# Patient Record
Sex: MALE | Race: BLACK OR AFRICAN AMERICAN | Employment: FULL TIME | URBAN - METROPOLITAN AREA
[De-identification: names, ages, dates, MRNs, and addresses within clinical notes are randomized per-mention and may not be internally consistent; named-entity substitution may affect disease eponyms.]

---

## 2017-03-06 DIAGNOSIS — E78.5 HYPERLIPIDEMIA, UNSPECIFIED HYPERLIPIDEMIA TYPE: ICD-10-CM

## 2017-03-06 RX ORDER — SIMVASTATIN 40 MG
40 TABLET ORAL NIGHTLY
Qty: 90 TABLET | Refills: 2 | Status: CANCELLED | OUTPATIENT
Start: 2017-03-06

## 2017-04-03 ENCOUNTER — OFFICE VISIT (OUTPATIENT)
Dept: INTERNAL MEDICINE CLINIC | Age: 47
End: 2017-04-03

## 2017-04-03 VITALS
BODY MASS INDEX: 27.3 KG/M2 | HEART RATE: 69 BPM | SYSTOLIC BLOOD PRESSURE: 120 MMHG | HEIGHT: 71 IN | TEMPERATURE: 97.9 F | OXYGEN SATURATION: 98 % | RESPIRATION RATE: 12 BRPM | WEIGHT: 195 LBS | DIASTOLIC BLOOD PRESSURE: 72 MMHG

## 2017-04-03 DIAGNOSIS — J30.9 ALLERGIC RHINITIS, UNSPECIFIED: ICD-10-CM

## 2017-04-03 DIAGNOSIS — R73.03 PREDIABETES: ICD-10-CM

## 2017-04-03 DIAGNOSIS — E78.5 HYPERLIPIDEMIA, UNSPECIFIED HYPERLIPIDEMIA TYPE: Primary | ICD-10-CM

## 2017-04-03 PROCEDURE — 99213 OFFICE O/P EST LOW 20 MIN: CPT | Performed by: INTERNAL MEDICINE

## 2017-04-03 ASSESSMENT — ENCOUNTER SYMPTOMS
COUGH: 0
CHEST TIGHTNESS: 0
WHEEZING: 0
SORE THROAT: 0
RHINORRHEA: 0
SHORTNESS OF BREATH: 0

## 2017-04-04 LAB
CHOLESTEROL, TOTAL: 180 MG/DL (ref 0–199)
ESTIMATED AVERAGE GLUCOSE: 119.8 MG/DL
HBA1C MFR BLD: 5.8 %
HDLC SERPL-MCNC: 58 MG/DL (ref 40–60)
LDL CHOLESTEROL CALCULATED: 109 MG/DL
TRIGL SERPL-MCNC: 65 MG/DL (ref 0–150)
VLDLC SERPL CALC-MCNC: 13 MG/DL

## 2017-10-19 ENCOUNTER — NURSE ONLY (OUTPATIENT)
Dept: INTERNAL MEDICINE CLINIC | Age: 47
End: 2017-10-19

## 2017-10-19 DIAGNOSIS — Z23 NEED FOR INFLUENZA VACCINATION: Primary | ICD-10-CM

## 2017-10-19 PROCEDURE — 90630 INFLUENZA, QUADV, 18-64 YRS, ID, PF, MICRO INJ, 0.1ML (FLUZONE QUADV, PF): CPT | Performed by: INTERNAL MEDICINE

## 2017-10-19 PROCEDURE — 90471 IMMUNIZATION ADMIN: CPT | Performed by: INTERNAL MEDICINE

## 2017-11-27 ENCOUNTER — OFFICE VISIT (OUTPATIENT)
Dept: INTERNAL MEDICINE CLINIC | Age: 47
End: 2017-11-27

## 2017-11-27 VITALS
RESPIRATION RATE: 12 BRPM | BODY MASS INDEX: 27.41 KG/M2 | HEIGHT: 71 IN | HEART RATE: 60 BPM | OXYGEN SATURATION: 99 % | WEIGHT: 195.8 LBS | SYSTOLIC BLOOD PRESSURE: 120 MMHG | TEMPERATURE: 98.9 F | DIASTOLIC BLOOD PRESSURE: 70 MMHG

## 2017-11-27 DIAGNOSIS — J30.9 ALLERGIC RHINITIS, UNSPECIFIED CHRONICITY, UNSPECIFIED SEASONALITY, UNSPECIFIED TRIGGER: ICD-10-CM

## 2017-11-27 DIAGNOSIS — Z00.00 ANNUAL PHYSICAL EXAM: ICD-10-CM

## 2017-11-27 DIAGNOSIS — Z00.00 ANNUAL PHYSICAL EXAM: Primary | ICD-10-CM

## 2017-11-27 DIAGNOSIS — E78.2 MIXED HYPERLIPIDEMIA: ICD-10-CM

## 2017-11-27 DIAGNOSIS — R73.03 PREDIABETES: ICD-10-CM

## 2017-11-27 LAB
A/G RATIO: 1.7 (ref 1.1–2.2)
ALBUMIN SERPL-MCNC: 4.7 G/DL (ref 3.4–5)
ALP BLD-CCNC: 65 U/L (ref 40–129)
ALT SERPL-CCNC: 26 U/L (ref 10–40)
ANION GAP SERPL CALCULATED.3IONS-SCNC: 19 MMOL/L (ref 3–16)
AST SERPL-CCNC: 25 U/L (ref 15–37)
BASOPHILS ABSOLUTE: 0 K/UL (ref 0–0.2)
BASOPHILS RELATIVE PERCENT: 0.4 %
BILIRUB SERPL-MCNC: 0.7 MG/DL (ref 0–1)
BILIRUBIN, POC: NORMAL
BLOOD URINE, POC: NORMAL
BUN BLDV-MCNC: 12 MG/DL (ref 7–20)
CALCIUM SERPL-MCNC: 10.2 MG/DL (ref 8.3–10.6)
CHLORIDE BLD-SCNC: 98 MMOL/L (ref 99–110)
CHOLESTEROL, TOTAL: 190 MG/DL (ref 0–199)
CLARITY, POC: NORMAL
CO2: 28 MMOL/L (ref 21–32)
COLOR, POC: NORMAL
CREAT SERPL-MCNC: 0.9 MG/DL (ref 0.9–1.3)
EOSINOPHILS ABSOLUTE: 0 K/UL (ref 0–0.6)
EOSINOPHILS RELATIVE PERCENT: 0.8 %
GFR AFRICAN AMERICAN: >60
GFR NON-AFRICAN AMERICAN: >60
GLOBULIN: 2.7 G/DL
GLUCOSE BLD-MCNC: 90 MG/DL (ref 70–99)
GLUCOSE URINE, POC: NORMAL
HCT VFR BLD CALC: 45.6 % (ref 40.5–52.5)
HDLC SERPL-MCNC: 66 MG/DL (ref 40–60)
HEMOGLOBIN: 14.9 G/DL (ref 13.5–17.5)
KETONES, POC: NORMAL
LDL CHOLESTEROL CALCULATED: 113 MG/DL
LEUKOCYTE EST, POC: NORMAL
LYMPHOCYTES ABSOLUTE: 1.7 K/UL (ref 1–5.1)
LYMPHOCYTES RELATIVE PERCENT: 27.6 %
MCH RBC QN AUTO: 28.3 PG (ref 26–34)
MCHC RBC AUTO-ENTMCNC: 32.6 G/DL (ref 31–36)
MCV RBC AUTO: 86.7 FL (ref 80–100)
MONOCYTES ABSOLUTE: 0.5 K/UL (ref 0–1.3)
MONOCYTES RELATIVE PERCENT: 7.7 %
NEUTROPHILS ABSOLUTE: 3.9 K/UL (ref 1.7–7.7)
NEUTROPHILS RELATIVE PERCENT: 63.5 %
NITRITE, POC: NORMAL
PDW BLD-RTO: 14.1 % (ref 12.4–15.4)
PH, POC: 6.5
PLATELET # BLD: 178 K/UL (ref 135–450)
PMV BLD AUTO: 9.9 FL (ref 5–10.5)
POTASSIUM SERPL-SCNC: 4.7 MMOL/L (ref 3.5–5.1)
PROTEIN, POC: NORMAL
RBC # BLD: 5.26 M/UL (ref 4.2–5.9)
SODIUM BLD-SCNC: 145 MMOL/L (ref 136–145)
SPECIFIC GRAVITY, POC: 1.01
TOTAL PROTEIN: 7.4 G/DL (ref 6.4–8.2)
TRIGL SERPL-MCNC: 55 MG/DL (ref 0–150)
UROBILINOGEN, POC: 0.2
VLDLC SERPL CALC-MCNC: 11 MG/DL
WBC # BLD: 6.2 K/UL (ref 4–11)

## 2017-11-27 PROCEDURE — 99396 PREV VISIT EST AGE 40-64: CPT | Performed by: INTERNAL MEDICINE

## 2017-11-27 PROCEDURE — 81002 URINALYSIS NONAUTO W/O SCOPE: CPT | Performed by: INTERNAL MEDICINE

## 2017-11-27 RX ORDER — SIMVASTATIN 40 MG
40 TABLET ORAL NIGHTLY
Qty: 90 TABLET | Refills: 2 | Status: SHIPPED | OUTPATIENT
Start: 2017-11-27 | End: 2018-08-08 | Stop reason: SDUPTHER

## 2017-11-27 ASSESSMENT — ENCOUNTER SYMPTOMS
CONSTIPATION: 0
RECTAL PAIN: 0
ABDOMINAL DISTENTION: 0
EYE ITCHING: 0
DIARRHEA: 0
SORE THROAT: 0
SINUS PAIN: 0
FACIAL SWELLING: 0
RHINORRHEA: 0
BACK PAIN: 0
ANAL BLEEDING: 0
CHOKING: 0
EYE REDNESS: 0
ABDOMINAL PAIN: 0
TROUBLE SWALLOWING: 0
SHORTNESS OF BREATH: 0
SINUS PRESSURE: 0
VOMITING: 0
CHEST TIGHTNESS: 0
EYE DISCHARGE: 0
PHOTOPHOBIA: 0
NAUSEA: 0
APNEA: 0
BLOOD IN STOOL: 0
VOICE CHANGE: 0
COUGH: 0
WHEEZING: 0
EYE PAIN: 0

## 2017-11-27 ASSESSMENT — PATIENT HEALTH QUESTIONNAIRE - PHQ9
2. FEELING DOWN, DEPRESSED OR HOPELESS: 0
SUM OF ALL RESPONSES TO PHQ QUESTIONS 1-9: 0
SUM OF ALL RESPONSES TO PHQ9 QUESTIONS 1 & 2: 0
1. LITTLE INTEREST OR PLEASURE IN DOING THINGS: 0

## 2017-11-27 NOTE — PATIENT INSTRUCTIONS
-Continue same medications  -Low fat, low cholesterol diet  -Regular aerobic exercise      Patient Education        Well Visit, Ages 25 to 48: Care Instructions  Your Care Instructions  Physical exams can help you stay healthy. Your doctor has checked your overall health and may have suggested ways to take good care of yourself. He or she also may have recommended tests. At home, you can help prevent illness with healthy eating, regular exercise, and other steps. Follow-up care is a key part of your treatment and safety. Be sure to make and go to all appointments, and call your doctor if you are having problems. It's also a good idea to know your test results and keep a list of the medicines you take. How can you care for yourself at home? · Reach and stay at a healthy weight. This will lower your risk for many problems, such as obesity, diabetes, heart disease, and high blood pressure. · Get at least 30 minutes of physical activity on most days of the week. Walking is a good choice. You also may want to do other activities, such as running, swimming, cycling, or playing tennis or team sports. Discuss any changes in your exercise program with your doctor. · Do not smoke or allow others to smoke around you. If you need help quitting, talk to your doctor about stop-smoking programs and medicines. These can increase your chances of quitting for good. · Talk to your doctor about whether you have any risk factors for sexually transmitted infections (STIs). Having one sex partner (who does not have STIs and does not have sex with anyone else) is a good way to avoid these infections. · Use birth control if you do not want to have children at this time. Talk with your doctor about the choices available and what might be best for you. · Protect your skin from too much sun. When you're outdoors from 10 a.m. to 4 p.m., stay in the shade or cover up with clothing and a hat with a wide brim.  Wear sunglasses that block UV if you have sex with more than one person, especially if your partners do not wear condoms. For men  · Tests for sexually transmitted infections (STIs). Ask whether you should have tests for STIs. You may be at risk if you have sex with more than one person, especially if you do not wear a condom. · Testicular cancer exam. Ask your doctor whether you should check your testicles regularly. · Prostate exam. Talk to your doctor about whether you should have a blood test (called a PSA test) for prostate cancer. Experts differ on whether and when men should have this test. Some experts suggest it if you are older than 39 and are -American or have a father or brother who got prostate cancer when he was younger than 72. When should you call for help? Watch closely for changes in your health, and be sure to contact your doctor if you have any problems or symptoms that concern you. Where can you learn more? Go to https://[employer]. org and sign in to your [employer] account. Enter P072 in the [employer] box to learn more about \"Well Visit, Ages 25 to 48: Care Instructions. \"     If you do not have an account, please click on the \"Sign Up Now\" link. Current as of: July 19, 2016  Content Version: 11.3  © 4373-4650 [employer]. Care instructions adapted under license by [facility] ([facility]). If you have questions about a medical condition or this instruction, always ask your healthcare professional. [place] [address] any warranty or liability for your use of this information.

## 2017-11-27 NOTE — PROGRESS NOTES
Hematological: Negative for adenopathy. Does not bruise/bleed easily. Psychiatric/Behavioral: Negative for decreased concentration, dysphoric mood and sleep disturbance. The patient is not nervous/anxious. Past Medical History:   Diagnosis Date    Allergic rhinitis 10/3/2016     Updating Deprecated Diagnoses    Hyperlipidemia     Influenza A 3/10/2016    Prediabetes 4/3/2017     Past Surgical History:   Procedure Laterality Date    HERNIA REPAIR  age 5   [facility] WISDOM TOOTH EXTRACTION  age 14       Outpatient Prescriptions Marked as Taking for the 11/27/17 encounter (Office Visit) with Atilano Closs, MD   Medication Sig Dispense Refill    fluticasone (FLONASE) 50 MCG/ACT nasal spray 2 sprays by Nasal route daily      simvastatin (ZOCOR) 40 MG tablet Take 1 tablet by mouth nightly 90 tablet 2    aluminum chloride (HYPERCARE) 20 % external solution APPLY TOPICALLY TO THE AFFECTED AREA EVERY NIGHT AT BEDTIME 37.5 mL 5    ibuprofen (ADVIL;MOTRIN) 600 MG tablet Take 1 tablet by mouth 3 times daily as needed for Pain 30 tablet 0    loratadine-pseudoephedrine (CLARITIN-D 12 HOUR) 5-120 MG per tablet Take 1 tablet by mouth 2 times daily. 20 tablet 0    MULTIPLE VITAMIN PO Take  by mouth.       FISH OIL Take 1,200 mg by mouth 2 times daily        No Known Allergies     Social History   Substance Use Topics    Smoking status: Never Smoker    Smokeless tobacco: Never Used    Alcohol use 1.2 oz/week     2 Cans of beer per week     Family History   Problem Relation Age of Onset    Mult Sclerosis Mother 48    Heart Disease Father      3V CABG age 79    High Cholesterol Father     Mult Sclerosis Brother 28    Hypertension Neg Hx     Diabetes Neg Hx        Immunization History   Administered Date(s) Administered    Influenza Virus Vaccine 10/06/2011, 10/15/2012, 11/08/2013, 10/22/2014    Influenza, Intradermal, Preservative free 09/29/2015    Influenza, Intradermal, Quadrivalent, Preservative Free 10/03/2016, 10/19/2017    Tdap (Boostrix, Adacel) 10/15/2012       Vitals:    11/27/17 1434   BP: 120/70   Site: Right Arm   Position: Sitting   Cuff Size: Medium Adult   Pulse: 60   Resp: 12   Temp: 98.9 °F (37.2 °C)   TempSrc: Oral   SpO2: 99%   Weight: 195 lb 12.8 oz (88.8 kg)   Height: 5' 11\" (1.803 m)     Body mass index is 27.31 kg/m². Physical Exam   Constitutional: He is oriented to person, place, and time. He appears well-developed and well-nourished. No distress. Middle aged BM   HENT:   Head: Normocephalic and atraumatic. Right Ear: Hearing, tympanic membrane and ear canal normal.   Left Ear: Hearing, tympanic membrane and ear canal normal.   Nose: Nose normal.   Mouth/Throat: Uvula is midline, oropharynx is clear and moist and mucous membranes are normal.   Eyes: Conjunctivae, EOM and lids are normal. Pupils are equal, round, and reactive to light. Neck: Neck supple. Carotid bruit is not present. No thyromegaly present. Cardiovascular: Normal rate, regular rhythm, S1 normal, S2 normal, normal heart sounds and intact distal pulses. Exam reveals no gallop and no friction rub. No murmur heard. Pulmonary/Chest: Effort normal and breath sounds normal. No respiratory distress. He has no wheezes. He has no rhonchi. He has no rales. Abdominal: Soft. Bowel sounds are normal. He exhibits no distension. There is no hepatosplenomegaly. There is no tenderness. There is no rebound and no guarding. Hernia confirmed negative in the right inguinal area and confirmed negative in the left inguinal area. Genitourinary: Rectum normal, prostate normal, testes normal and penis normal.   Musculoskeletal: Normal range of motion. He exhibits no edema or tenderness. Right shoulder: He exhibits normal range of motion and no tenderness. Left shoulder: He exhibits normal range of motion and no tenderness. Right elbow: He exhibits normal range of motion and no swelling. No tenderness found. Left elbow: He exhibits normal range of motion and no swelling. No tenderness found. Right wrist: He exhibits no tenderness and no swelling. Left wrist: He exhibits no tenderness and no swelling. Right hip: He exhibits normal range of motion and no tenderness. Left hip: He exhibits normal range of motion and no tenderness. Right knee: He exhibits normal range of motion and no swelling. No tenderness found. Left knee: He exhibits normal range of motion and no swelling. No tenderness found. Right ankle: He exhibits normal range of motion and no swelling. No tenderness. Left ankle: He exhibits normal range of motion and no swelling. No tenderness. Cervical back: He exhibits normal range of motion, no tenderness and no spasm. Thoracic back: He exhibits no tenderness and no spasm. Lumbar back: He exhibits normal range of motion, no tenderness and no spasm. Right upper arm: He exhibits no tenderness. Left upper arm: He exhibits no tenderness. Right forearm: He exhibits no tenderness. Left forearm: He exhibits no tenderness. Right hand: He exhibits no tenderness and no swelling. Left hand: He exhibits no tenderness and no swelling. Right lower leg: He exhibits no tenderness. Left lower leg: He exhibits no tenderness. Right foot: There is no tenderness. Left foot: There is no tenderness. Lymphadenopathy:        Head (right side): No submandibular adenopathy present. Head (left side): No submandibular adenopathy present. He has no cervical adenopathy. Right: No inguinal adenopathy present. Left: No inguinal adenopathy present. Neurological: He is alert and oriented to person, place, and time. He has normal strength and normal reflexes. No cranial nerve deficit. Gait normal.   Skin: Skin is warm, dry and intact. No bruising and no rash noted.  No

## 2017-11-28 ENCOUNTER — TELEPHONE (OUTPATIENT)
Dept: INTERNAL MEDICINE CLINIC | Age: 47
End: 2017-11-28

## 2017-11-28 LAB
ESTIMATED AVERAGE GLUCOSE: 128.4 MG/DL
HBA1C MFR BLD: 6.1 %

## 2018-01-18 ENCOUNTER — OFFICE VISIT (OUTPATIENT)
Dept: INTERNAL MEDICINE CLINIC | Age: 48
End: 2018-01-18

## 2018-01-18 VITALS
HEART RATE: 68 BPM | SYSTOLIC BLOOD PRESSURE: 120 MMHG | WEIGHT: 192 LBS | OXYGEN SATURATION: 98 % | DIASTOLIC BLOOD PRESSURE: 80 MMHG | TEMPERATURE: 98.7 F | HEIGHT: 71 IN | BODY MASS INDEX: 26.88 KG/M2

## 2018-01-18 DIAGNOSIS — J32.9 SINUSITIS, UNSPECIFIED CHRONICITY, UNSPECIFIED LOCATION: Primary | ICD-10-CM

## 2018-01-18 PROCEDURE — 99213 OFFICE O/P EST LOW 20 MIN: CPT | Performed by: INTERNAL MEDICINE

## 2018-01-18 RX ORDER — GUAIFENESIN AND DEXTROMETHORPHAN HYDROBROMIDE 600; 30 MG/1; MG/1
TABLET, EXTENDED RELEASE ORAL
COMMUNITY
End: 2019-04-22

## 2018-01-18 ASSESSMENT — ENCOUNTER SYMPTOMS
RHINORRHEA: 0
SHORTNESS OF BREATH: 0
CHEST TIGHTNESS: 0
SORE THROAT: 0
SINUS PRESSURE: 0
WHEEZING: 0
COUGH: 0

## 2018-05-29 ENCOUNTER — TELEPHONE (OUTPATIENT)
Dept: INTERNAL MEDICINE CLINIC | Age: 48
End: 2018-05-29

## 2018-08-08 ENCOUNTER — OFFICE VISIT (OUTPATIENT)
Dept: INTERNAL MEDICINE CLINIC | Age: 48
End: 2018-08-08

## 2018-08-08 VITALS
OXYGEN SATURATION: 99 % | WEIGHT: 194.2 LBS | HEART RATE: 69 BPM | TEMPERATURE: 98.1 F | BODY MASS INDEX: 27.19 KG/M2 | HEIGHT: 71 IN | SYSTOLIC BLOOD PRESSURE: 110 MMHG | DIASTOLIC BLOOD PRESSURE: 70 MMHG

## 2018-08-08 DIAGNOSIS — E78.2 MIXED HYPERLIPIDEMIA: Primary | ICD-10-CM

## 2018-08-08 DIAGNOSIS — R61 HYPERHIDROSIS: ICD-10-CM

## 2018-08-08 DIAGNOSIS — R73.03 PREDIABETES: ICD-10-CM

## 2018-08-08 PROCEDURE — 99213 OFFICE O/P EST LOW 20 MIN: CPT | Performed by: INTERNAL MEDICINE

## 2018-08-08 RX ORDER — SIMVASTATIN 40 MG
40 TABLET ORAL NIGHTLY
Qty: 90 TABLET | Refills: 2 | Status: SHIPPED | OUTPATIENT
Start: 2018-08-08 | End: 2019-02-06 | Stop reason: SDUPTHER

## 2018-08-08 ASSESSMENT — ENCOUNTER SYMPTOMS
SHORTNESS OF BREATH: 0
ABDOMINAL PAIN: 0
CHEST TIGHTNESS: 0

## 2018-08-08 NOTE — PROGRESS NOTES
William Bey   YOB: 1970    Date of Visit:  8/8/2018    Chief Complaint   Patient presents with    Hyperlipidemia       HPI  Hyperlipidemia- Pt takes Simvastatin. Pt denies side effects. Pt decreases fat and cholesterol in his diet. Prediabetes- Pt decreases carbohydrates and sugar in his diet. Pt plays basketball 2-3 times per week. Pt states he needs a refill for Drysol solution. Pt states he lost his 80year old father this summer from cancer. Review of Systems   Constitutional: Negative for activity change. Eyes: Negative for visual disturbance. Respiratory: Negative for chest tightness and shortness of breath. Cardiovascular: Negative for chest pain, palpitations and leg swelling. Gastrointestinal: Negative for abdominal pain. Genitourinary: Negative for frequency and hematuria. Musculoskeletal: Negative for myalgias. Neurological: Negative for dizziness, syncope, light-headedness and headaches. No Known Allergies  Outpatient Prescriptions Marked as Taking for the 8/8/18 encounter (Office Visit) with Yayo Stout MD   Medication Sig Dispense Refill    aluminum chloride (HYPERCARE) 20 % external solution APPLY TOPICALLY TO THE AFFECTED AREA EVERY NIGHT AT BEDTIME 37.5 mL 5    simvastatin (ZOCOR) 40 MG tablet Take 1 tablet by mouth nightly 90 tablet 2    Dextromethorphan-Guaifenesin (MUCINEX DM)  MG TB12 Take by mouth      fluticasone (FLONASE) 50 MCG/ACT nasal spray 2 sprays by Nasal route daily      ibuprofen (ADVIL;MOTRIN) 600 MG tablet Take 1 tablet by mouth 3 times daily as needed for Pain 30 tablet 0    MULTIPLE VITAMIN PO Take  by mouth.       FISH OIL Take 1,200 mg by mouth 2 times daily            Vitals:    08/08/18 0948   BP: 110/70   Site: Left Arm   Position: Sitting   Cuff Size: Medium Adult   Pulse: 69   Temp: 98.1 °F (36.7 °C)   TempSrc: Oral   SpO2: 99%   Weight: 194 lb 3.2 oz (88.1 kg)   Height: 5' 11\" (1.803 m)     Body

## 2018-08-15 DIAGNOSIS — E78.2 MIXED HYPERLIPIDEMIA: ICD-10-CM

## 2018-08-15 DIAGNOSIS — R73.03 PREDIABETES: ICD-10-CM

## 2018-08-15 LAB
ALBUMIN SERPL-MCNC: 4.7 G/DL (ref 3.4–5)
ALP BLD-CCNC: 66 U/L (ref 40–129)
ALT SERPL-CCNC: 31 U/L (ref 10–40)
AST SERPL-CCNC: 28 U/L (ref 15–37)
BILIRUB SERPL-MCNC: 0.8 MG/DL (ref 0–1)
BILIRUBIN DIRECT: <0.2 MG/DL (ref 0–0.3)
BILIRUBIN, INDIRECT: NORMAL MG/DL (ref 0–1)
CHOLESTEROL, TOTAL: 181 MG/DL (ref 0–199)
GLUCOSE BLD-MCNC: 105 MG/DL (ref 70–99)
HDLC SERPL-MCNC: 62 MG/DL (ref 40–60)
LDL CHOLESTEROL CALCULATED: 107 MG/DL
TOTAL PROTEIN: 7.4 G/DL (ref 6.4–8.2)
TRIGL SERPL-MCNC: 58 MG/DL (ref 0–150)
VLDLC SERPL CALC-MCNC: 12 MG/DL

## 2018-08-16 LAB
ESTIMATED AVERAGE GLUCOSE: 125.5 MG/DL
HBA1C MFR BLD: 6 %

## 2018-09-19 ENCOUNTER — NURSE ONLY (OUTPATIENT)
Dept: INTERNAL MEDICINE CLINIC | Age: 48
End: 2018-09-19

## 2018-09-19 DIAGNOSIS — Z23 NEED FOR INFLUENZA VACCINATION: Primary | ICD-10-CM

## 2018-09-19 PROCEDURE — 90686 IIV4 VACC NO PRSV 0.5 ML IM: CPT | Performed by: INTERNAL MEDICINE

## 2018-09-19 PROCEDURE — 90471 IMMUNIZATION ADMIN: CPT | Performed by: INTERNAL MEDICINE

## 2018-12-19 ENCOUNTER — OFFICE VISIT (OUTPATIENT)
Dept: INTERNAL MEDICINE CLINIC | Age: 48
End: 2018-12-19
Payer: COMMERCIAL

## 2018-12-19 VITALS
HEART RATE: 80 BPM | WEIGHT: 198 LBS | DIASTOLIC BLOOD PRESSURE: 70 MMHG | HEIGHT: 71 IN | OXYGEN SATURATION: 97 % | TEMPERATURE: 98.2 F | SYSTOLIC BLOOD PRESSURE: 110 MMHG | BODY MASS INDEX: 27.72 KG/M2

## 2018-12-19 DIAGNOSIS — J32.9 SINUSITIS, UNSPECIFIED CHRONICITY, UNSPECIFIED LOCATION: Primary | ICD-10-CM

## 2018-12-19 DIAGNOSIS — J04.0 LARYNGITIS: ICD-10-CM

## 2018-12-19 PROCEDURE — 99213 OFFICE O/P EST LOW 20 MIN: CPT | Performed by: INTERNAL MEDICINE

## 2018-12-19 RX ORDER — AMOXICILLIN 875 MG/1
875 TABLET, COATED ORAL 2 TIMES DAILY
Qty: 20 TABLET | Refills: 0 | Status: SHIPPED | OUTPATIENT
Start: 2018-12-19 | End: 2018-12-29

## 2018-12-19 ASSESSMENT — ENCOUNTER SYMPTOMS
CHEST TIGHTNESS: 0
VOICE CHANGE: 1
SINUS PRESSURE: 1
RHINORRHEA: 0
COUGH: 0
WHEEZING: 0
SHORTNESS OF BREATH: 0
SORE THROAT: 0

## 2018-12-19 ASSESSMENT — PATIENT HEALTH QUESTIONNAIRE - PHQ9
SUM OF ALL RESPONSES TO PHQ QUESTIONS 1-9: 0
SUM OF ALL RESPONSES TO PHQ QUESTIONS 1-9: 0
2. FEELING DOWN, DEPRESSED OR HOPELESS: 0
1. LITTLE INTEREST OR PLEASURE IN DOING THINGS: 0
SUM OF ALL RESPONSES TO PHQ9 QUESTIONS 1 & 2: 0

## 2018-12-19 NOTE — PATIENT INSTRUCTIONS
-Continue Flonase nasal spray  -Continue Mucinex D 1 tablet 2 times daily  -Ibuprofen as needed  -Amoxicillin prescription to take if sinus symptoms do not improve or worsen in the next few days  -warm fluids for throat  -salt water gargles 3-4 times daily

## 2018-12-19 NOTE — PROGRESS NOTES
Tympanic membrane and ear canal normal.   Nose: Right sinus exhibits no maxillary sinus tenderness. Left sinus exhibits no maxillary sinus tenderness. Mouth/Throat: Oropharynx is clear and moist and mucous membranes are normal.   Eyes: Pupils are equal, round, and reactive to light. Conjunctivae, EOM and lids are normal.   Pulmonary/Chest: Effort normal and breath sounds normal. He has no wheezes. He has no rhonchi. He has no rales. Nursing note reviewed. No results found for this visit on 12/19/18. Lab Review   Orders Only on 08/15/2018   Component Date Value    Cholesterol, Total 08/15/2018 181     Triglycerides 08/15/2018 58     HDL 08/15/2018 62*    LDL Calculated 08/15/2018 107*    VLDL Cholesterol Calcula* 08/15/2018 12     Hemoglobin A1C 08/15/2018 6.0     eAG 08/15/2018 125.5     Total Protein 08/15/2018 7.4     Alb 08/15/2018 4.7     Alkaline Phosphatase 08/15/2018 66     ALT 08/15/2018 31     AST 08/15/2018 28     Total Bilirubin 08/15/2018 0.8     Bilirubin, Direct 08/15/2018 <0.2     Bilirubin, Indirect 08/15/2018 see below     Glucose 08/15/2018 105*         Assessment/Plan     1. Sinusitis, unspecified chronicity, unspecified location  -Continue Flonase nasal spray  -Continue Mucinex D 1 tablet 2 times daily  -Ibuprofen as needed  - Rx for amoxicillin (AMOXIL) 875 MG tablet; Take 1 tablet by mouth 2 times daily for 10 days  Dispense: 20 tablet; Refill: 0 given to take if sinus symptoms do not improve or worsen in the next few days    2. Laryngitis  -warm fluids for throat  -salt water gargles 3-4 times daily       Discussed medications with patient, who voiced understanding of their use and indications. All questions answered. Return if symptoms worsen or fail to improve.

## 2019-02-06 ENCOUNTER — OFFICE VISIT (OUTPATIENT)
Dept: INTERNAL MEDICINE CLINIC | Age: 49
End: 2019-02-06
Payer: COMMERCIAL

## 2019-02-06 VITALS
RESPIRATION RATE: 12 BRPM | WEIGHT: 195.6 LBS | TEMPERATURE: 98.2 F | OXYGEN SATURATION: 97 % | BODY MASS INDEX: 27.38 KG/M2 | SYSTOLIC BLOOD PRESSURE: 110 MMHG | DIASTOLIC BLOOD PRESSURE: 72 MMHG | HEART RATE: 75 BPM | HEIGHT: 71 IN

## 2019-02-06 DIAGNOSIS — Z11.4 SCREENING FOR HIV WITHOUT PRESENCE OF RISK FACTORS: ICD-10-CM

## 2019-02-06 DIAGNOSIS — R73.03 PREDIABETES: ICD-10-CM

## 2019-02-06 DIAGNOSIS — J30.9 ALLERGIC RHINITIS, UNSPECIFIED SEASONALITY, UNSPECIFIED TRIGGER: ICD-10-CM

## 2019-02-06 DIAGNOSIS — R61 HYPERHIDROSIS: ICD-10-CM

## 2019-02-06 DIAGNOSIS — Z00.00 ANNUAL PHYSICAL EXAM: Primary | ICD-10-CM

## 2019-02-06 DIAGNOSIS — E78.2 MIXED HYPERLIPIDEMIA: ICD-10-CM

## 2019-02-06 DIAGNOSIS — Z00.00 ANNUAL PHYSICAL EXAM: ICD-10-CM

## 2019-02-06 LAB
A/G RATIO: 1.7 (ref 1.1–2.2)
ALBUMIN SERPL-MCNC: 4.7 G/DL (ref 3.4–5)
ALP BLD-CCNC: 73 U/L (ref 40–129)
ALT SERPL-CCNC: 20 U/L (ref 10–40)
ANION GAP SERPL CALCULATED.3IONS-SCNC: 18 MMOL/L (ref 3–16)
AST SERPL-CCNC: 24 U/L (ref 15–37)
BASOPHILS ABSOLUTE: 0 K/UL (ref 0–0.2)
BASOPHILS RELATIVE PERCENT: 0.3 %
BILIRUB SERPL-MCNC: 0.7 MG/DL (ref 0–1)
BILIRUBIN, POC: NORMAL
BLOOD URINE, POC: NORMAL
BUN BLDV-MCNC: 14 MG/DL (ref 7–20)
CALCIUM SERPL-MCNC: 10 MG/DL (ref 8.3–10.6)
CHLORIDE BLD-SCNC: 100 MMOL/L (ref 99–110)
CHOLESTEROL, TOTAL: 164 MG/DL (ref 0–199)
CLARITY, POC: CLEAR
CO2: 24 MMOL/L (ref 21–32)
COLOR, POC: YELLOW
CREAT SERPL-MCNC: 1.2 MG/DL (ref 0.9–1.3)
EOSINOPHILS ABSOLUTE: 0 K/UL (ref 0–0.6)
EOSINOPHILS RELATIVE PERCENT: 0.5 %
GFR AFRICAN AMERICAN: >60
GFR NON-AFRICAN AMERICAN: >60
GLOBULIN: 2.7 G/DL
GLUCOSE BLD-MCNC: 103 MG/DL (ref 70–99)
GLUCOSE URINE, POC: NORMAL
HCT VFR BLD CALC: 44.7 % (ref 40.5–52.5)
HDLC SERPL-MCNC: 56 MG/DL (ref 40–60)
HEMOGLOBIN: 14.5 G/DL (ref 13.5–17.5)
KETONES, POC: NORMAL
LDL CHOLESTEROL CALCULATED: 96 MG/DL
LEUKOCYTE EST, POC: NORMAL
LYMPHOCYTES ABSOLUTE: 1.6 K/UL (ref 1–5.1)
LYMPHOCYTES RELATIVE PERCENT: 23 %
MCH RBC QN AUTO: 28.1 PG (ref 26–34)
MCHC RBC AUTO-ENTMCNC: 32.3 G/DL (ref 31–36)
MCV RBC AUTO: 86.8 FL (ref 80–100)
MONOCYTES ABSOLUTE: 0.5 K/UL (ref 0–1.3)
MONOCYTES RELATIVE PERCENT: 6.4 %
NEUTROPHILS ABSOLUTE: 5 K/UL (ref 1.7–7.7)
NEUTROPHILS RELATIVE PERCENT: 69.8 %
NITRITE, POC: NORMAL
PDW BLD-RTO: 14.1 % (ref 12.4–15.4)
PH, POC: 6.5
PLATELET # BLD: 190 K/UL (ref 135–450)
PMV BLD AUTO: 9.5 FL (ref 5–10.5)
POTASSIUM SERPL-SCNC: 4.5 MMOL/L (ref 3.5–5.1)
PROTEIN, POC: NORMAL
RBC # BLD: 5.15 M/UL (ref 4.2–5.9)
SODIUM BLD-SCNC: 142 MMOL/L (ref 136–145)
SPECIFIC GRAVITY, POC: 1.02
TOTAL PROTEIN: 7.4 G/DL (ref 6.4–8.2)
TRIGL SERPL-MCNC: 59 MG/DL (ref 0–150)
TSH SERPL DL<=0.05 MIU/L-ACNC: 3.54 UIU/ML (ref 0.27–4.2)
UROBILINOGEN, POC: 0.2
VLDLC SERPL CALC-MCNC: 12 MG/DL
WBC # BLD: 7.1 K/UL (ref 4–11)

## 2019-02-06 PROCEDURE — 81002 URINALYSIS NONAUTO W/O SCOPE: CPT | Performed by: INTERNAL MEDICINE

## 2019-02-06 PROCEDURE — 99396 PREV VISIT EST AGE 40-64: CPT | Performed by: INTERNAL MEDICINE

## 2019-02-06 RX ORDER — SIMVASTATIN 40 MG
40 TABLET ORAL NIGHTLY
Qty: 90 TABLET | Refills: 2 | Status: SHIPPED | OUTPATIENT
Start: 2019-02-06 | End: 2019-05-22 | Stop reason: SDUPTHER

## 2019-02-06 ASSESSMENT — ENCOUNTER SYMPTOMS
ANAL BLEEDING: 0
EYE REDNESS: 0
APNEA: 0
CONSTIPATION: 0
EYE ITCHING: 0
BLOOD IN STOOL: 0
TROUBLE SWALLOWING: 0
SINUS PAIN: 0
VOICE CHANGE: 0
NAUSEA: 0
FACIAL SWELLING: 0
VOMITING: 0
PHOTOPHOBIA: 0
EYE PAIN: 0
COUGH: 0
SINUS PRESSURE: 1
ABDOMINAL PAIN: 0
CHEST TIGHTNESS: 0
SORE THROAT: 0
SHORTNESS OF BREATH: 0
EYE DISCHARGE: 0
WHEEZING: 0
RECTAL PAIN: 0
BACK PAIN: 0
RHINORRHEA: 0
DIARRHEA: 0
ABDOMINAL DISTENTION: 0
CHOKING: 0

## 2019-02-07 LAB
ESTIMATED AVERAGE GLUCOSE: 134.1 MG/DL
HBA1C MFR BLD: 6.3 %
HIV AG/AB: NORMAL
HIV ANTIGEN: NORMAL
HIV-1 ANTIBODY: NORMAL
HIV-2 AB: NORMAL

## 2019-04-22 ENCOUNTER — OFFICE VISIT (OUTPATIENT)
Dept: INTERNAL MEDICINE CLINIC | Age: 49
End: 2019-04-22
Payer: COMMERCIAL

## 2019-04-22 VITALS
OXYGEN SATURATION: 96 % | BODY MASS INDEX: 27.33 KG/M2 | WEIGHT: 195.2 LBS | HEART RATE: 64 BPM | DIASTOLIC BLOOD PRESSURE: 70 MMHG | HEIGHT: 71 IN | TEMPERATURE: 98.5 F | SYSTOLIC BLOOD PRESSURE: 124 MMHG

## 2019-04-22 DIAGNOSIS — M25.511 RIGHT SHOULDER PAIN, UNSPECIFIED CHRONICITY: Primary | ICD-10-CM

## 2019-04-22 PROCEDURE — 99213 OFFICE O/P EST LOW 20 MIN: CPT | Performed by: INTERNAL MEDICINE

## 2019-04-22 RX ORDER — IBUPROFEN 600 MG/1
600 TABLET ORAL 3 TIMES DAILY PRN
Qty: 30 TABLET | Refills: 0 | Status: SHIPPED | OUTPATIENT
Start: 2019-04-22

## 2019-04-22 NOTE — PROGRESS NOTES
Sejal Marshall   Date ofBirth:  1970    Date of Visit:  4/22/2019    Chief Complaint   Patient presents with    Shoulder Pain     right       HPI  Pt c/o right shoulder pain started 2-3 days after doing a lot of house painting 1.5 months ago. Pt states it has improved but not gone away. Shoulder pain is dull and only occurs when his arm is in a certain position. Pt states he has pain with reaching down. Pt denies difficulty with range of motion. Pt denies decreased strength. Pt denies paresthesias and swelling. Pt hasn't taken anything for pain. Review of Systems   Constitutional: Negative for chills and fever. Musculoskeletal: Positive for arthralgias. Negative for joint swelling and neck pain. Neurological: Negative for weakness and numbness. No Known Allergies  Outpatient Medications Marked as Taking for the 4/22/19 encounter (Office Visit) with Nakul Vale MD   Medication Sig Dispense Refill    aluminum chloride (HYPERCARE) 20 % external solution APPLY TOPICALLY TO THE AFFECTED AREA EVERY NIGHT AT BEDTIME 37.5 mL 5    simvastatin (ZOCOR) 40 MG tablet Take 1 tablet by mouth nightly 90 tablet 2    fluticasone (FLONASE) 50 MCG/ACT nasal spray 2 sprays by Nasal route daily      ibuprofen (ADVIL;MOTRIN) 600 MG tablet Take 1 tablet by mouth 3 times daily as needed for Pain 30 tablet 0    MULTIPLE VITAMIN PO Take  by mouth.  FISH OIL Take 1,200 mg by mouth 2 times daily            Vitals:    04/22/19 1445   BP: 124/70   Site: Left Upper Arm   Position: Sitting   Cuff Size: Medium Adult   Pulse: 64   Temp: 98.5 °F (36.9 °C)   TempSrc: Oral   SpO2: 96%   Weight: 195 lb 3.2 oz (88.5 kg)   Height: 5' 11\" (1.803 m)     Body mass index is 27.22 kg/m². Physical Exam   Constitutional: He appears well-developed. No distress. HENT:   Mouth/Throat: Oropharynx is clear and moist.   Eyes: Pupils are equal, round, and reactive to light.  EOM are normal.   Musculoskeletal:        Right 02/06/2019 Yellow     Clarity, UA 02/06/2019 Clear     Glucose, UA POC 02/06/2019 N     Bilirubin, UA 02/06/2019 N     Ketones, UA 02/06/2019 N     Spec Grav, UA 02/06/2019 1.020     Blood, UA POC 02/06/2019 N     pH, UA 02/06/2019 6.5     Protein, UA POC 02/06/2019 N     Urobilinogen, UA 02/06/2019 0.2     Leukocytes, UA 02/06/2019 N     Nitrite, UA 02/06/2019 N          Assessment/Plan     1. Right shoulder pain, unspecified chronicity  - may likely be tendonitis due to overuse  - ibuprofen (ADVIL;MOTRIN) 600 MG tablet; Take 1 tablet by mouth 3 times daily as needed for Pain  Dispense: 30 tablet; Refill: 0  - Referral to [address] [place] [address] 2 MD [place], Orthopedic Surgery, [place]    Discussed medications with patient, who voiced understanding of their use and indications. All questions answered. No follow-ups on file.

## 2019-05-22 DIAGNOSIS — E78.2 MIXED HYPERLIPIDEMIA: ICD-10-CM

## 2019-05-22 RX ORDER — SIMVASTATIN 40 MG
40 TABLET ORAL NIGHTLY
Qty: 90 TABLET | Refills: 1 | Status: SHIPPED | OUTPATIENT
Start: 2019-05-22 | End: 2020-01-06 | Stop reason: SDUPTHER

## 2019-09-24 ENCOUNTER — NURSE ONLY (OUTPATIENT)
Dept: PRIMARY CARE CLINIC | Age: 49
End: 2019-09-24
Payer: COMMERCIAL

## 2019-09-24 DIAGNOSIS — Z23 NEED FOR INFLUENZA VACCINATION: Primary | ICD-10-CM

## 2019-09-24 PROCEDURE — 90686 IIV4 VACC NO PRSV 0.5 ML IM: CPT | Performed by: INTERNAL MEDICINE

## 2019-09-24 PROCEDURE — 90471 IMMUNIZATION ADMIN: CPT | Performed by: INTERNAL MEDICINE

## 2019-09-24 NOTE — PROGRESS NOTES
Vaccine Information Sheet, \"Influenza - Inactivated\"  given to Talia Adler, or parent/legal guardian of  Talia Adler and verbalized understanding. Patient responses:    Have you ever had a reaction to a flu vaccine? No  Do you have any current illness? No  Have you ever had Guillian [place] Syndrome? No  Do you have a serious allergy to any of the follow: Neomycin, Polymyxin, Thimerosal, eggs or egg products? No    Flu vaccine given per order. Please see immunization tab. Risks and benefits explained. Current VIS given.

## 2019-12-02 ENCOUNTER — TELEPHONE (OUTPATIENT)
Dept: PRIMARY CARE CLINIC | Age: 49
End: 2019-12-02

## 2020-01-06 ENCOUNTER — OFFICE VISIT (OUTPATIENT)
Dept: PRIMARY CARE CLINIC | Age: 50
End: 2020-01-06
Payer: COMMERCIAL

## 2020-01-06 ENCOUNTER — HOSPITAL ENCOUNTER (OUTPATIENT)
Dept: GENERAL RADIOLOGY | Age: 50
Discharge: HOME | End: 2020-01-06
Payer: COMMERCIAL

## 2020-01-06 ENCOUNTER — HOSPITAL ENCOUNTER (OUTPATIENT)
Age: 50
Discharge: HOME | End: 2020-01-06
Payer: COMMERCIAL

## 2020-01-06 VITALS
HEART RATE: 76 BPM | WEIGHT: 200.4 LBS | DIASTOLIC BLOOD PRESSURE: 80 MMHG | SYSTOLIC BLOOD PRESSURE: 128 MMHG | BODY MASS INDEX: 28.06 KG/M2 | HEIGHT: 71 IN | OXYGEN SATURATION: 98 %

## 2020-01-06 PROCEDURE — 73630 X-RAY EXAM OF FOOT: CPT

## 2020-01-06 PROCEDURE — 99214 OFFICE O/P EST MOD 30 MIN: CPT | Performed by: INTERNAL MEDICINE

## 2020-01-06 RX ORDER — SIMVASTATIN 40 MG
40 TABLET ORAL NIGHTLY
Qty: 90 TABLET | Refills: 1 | Status: SHIPPED | OUTPATIENT
Start: 2020-01-06 | End: 2020-10-08 | Stop reason: SDUPTHER

## 2020-01-06 ASSESSMENT — PATIENT HEALTH QUESTIONNAIRE - PHQ9
2. FEELING DOWN, DEPRESSED OR HOPELESS: 0
1. LITTLE INTEREST OR PLEASURE IN DOING THINGS: 0
SUM OF ALL RESPONSES TO PHQ QUESTIONS 1-9: 0
SUM OF ALL RESPONSES TO PHQ QUESTIONS 1-9: 0
SUM OF ALL RESPONSES TO PHQ9 QUESTIONS 1 & 2: 0

## 2020-01-06 NOTE — PROGRESS NOTES
Antibody 02/06/2019 Non-Reactive     HIV ANTIGEN 02/06/2019 Non-Reactive     HIV-2 Ab 02/06/2019 Non-Reactive          Assessment/Plan     1. Mixed hyperlipidemia  - Decrease simvastatin (ZOCOR) 40 MG tablet; Take 1/2 tablet by mouth nightly  Dispense: 90 tablet; Refill: 1  -Low fat, low cholesterol diet  -Regular aerobic exercise    2. Prediabetes  -Low carbohydrate diet  -Regular aerobic exercise    3. Allergic rhinitis, unspecified seasonality, unspecified trigger  -stable  -Continue same medications    4. Bony abnormality  - XR FOOT LEFT (MIN 3 VIEWS); Future  - Referral to [address], DPM, Podiatry, [facility]      Discussed medications with patient, who voiced understanding of their use and indications. All questions answered.       Return in about 3 months (around 4/6/2020) for annual physical exam.

## 2020-01-06 NOTE — PATIENT INSTRUCTIONS
1. Mixed hyperlipidemia  - Decrease simvastatin (ZOCOR) 40 MG tablet; Take 1/2 tablet by mouth nightly  Dispense: 90 tablet; Refill: 1  -Low fat, low cholesterol diet  -Regular aerobic exercise    2. Prediabetes  -Low carbohydrate diet  -Regular aerobic exercise    3. Allergic rhinitis, unspecified seasonality, unspecified trigger  -stable  -Continue same medications    4. Bony abnormality  - XR FOOT LEFT (MIN 3 VIEWS);  Future  - Referral to [facility], Derek Garcia DPM, Podiatry, [place]

## 2020-01-08 ENCOUNTER — TELEPHONE (OUTPATIENT)
Dept: PRIMARY CARE CLINIC | Age: 50
End: 2020-01-08

## 2020-01-12 ASSESSMENT — ENCOUNTER SYMPTOMS
CONSTIPATION: 0
RHINORRHEA: 0
SORE THROAT: 0
NAUSEA: 0
DIARRHEA: 0
ABDOMINAL PAIN: 0
WHEEZING: 0
SINUS PAIN: 0
COUGH: 0
CHEST TIGHTNESS: 0
SHORTNESS OF BREATH: 0
SINUS PRESSURE: 1
VOMITING: 0

## 2020-01-13 DIAGNOSIS — E78.2 MIXED HYPERLIPIDEMIA: ICD-10-CM

## 2020-01-13 DIAGNOSIS — R73.03 PREDIABETES: ICD-10-CM

## 2020-01-13 LAB
CHOLESTEROL, TOTAL: 167 MG/DL (ref 0–199)
HDLC SERPL-MCNC: 59 MG/DL (ref 40–60)
LDL CHOLESTEROL CALCULATED: 97 MG/DL
TRIGL SERPL-MCNC: 55 MG/DL (ref 0–150)
VLDLC SERPL CALC-MCNC: 11 MG/DL

## 2020-01-14 LAB
ESTIMATED AVERAGE GLUCOSE: 122.6 MG/DL
HBA1C MFR BLD: 5.9 %

## 2020-01-16 ENCOUNTER — OFFICE VISIT (OUTPATIENT)
Dept: ORTHOPEDIC SURGERY | Age: 50
End: 2020-01-16
Payer: COMMERCIAL

## 2020-01-16 ENCOUNTER — TELEPHONE (OUTPATIENT)
Dept: ORTHOPEDIC SURGERY | Age: 50
End: 2020-01-16

## 2020-01-16 VITALS
BODY MASS INDEX: 28.06 KG/M2 | SYSTOLIC BLOOD PRESSURE: 135 MMHG | DIASTOLIC BLOOD PRESSURE: 72 MMHG | HEIGHT: 71 IN | HEART RATE: 62 BPM | WEIGHT: 200.4 LBS

## 2020-01-16 PROCEDURE — L4361 PNEUMA/VAC WALK BOOT PRE OTS: HCPCS | Performed by: PODIATRIST

## 2020-01-16 PROCEDURE — 99203 OFFICE O/P NEW LOW 30 MIN: CPT | Performed by: PODIATRIST

## 2020-01-16 NOTE — PROGRESS NOTES
HISTORY OF PRESENT ILLNESS: This is an initial visit for 28-year-old male who presents with a couple different foot related complaints. The chief complaint is that of a bump. On the top of his left foot recently. He is not exactly sure how long it is been there but he just recently noticed it. He states that it is not painful nor does it cause problems or issues. He also complains of pain on the medial aspect of his right midfoot. Last week he was playing basketball when he pushed off on his foot and felt a sharp pain. This was followed by swelling and bruising. His pain level is decreasing in that area. FAMILY HISTORY: Documented in chart. SOCIAL HISTORY: Documented in chart. REVIEW OF SYSTEMS:  The patient denies any issues with dermatologic, pulmonary, cardiovascular, genitourinary, hematologic, gastrointestinal, neurologic, psychiatric, and HEENT systems. Family History, Social History, and Review of Systems were reviewed from patient history form dated on 1/16/2020 and available in the patient's chart under the MEDIA tab. PHYSICAL EXAMINATION:     The patient is alert and orientated x3. The left foot demonstrates a large prominence at the dorsal central aspect of the left midfoot. This is nonpainful to palpation. There is no erythema or ecchymosis associated with this. He does not demonstrate any paresthesias in this area. The right foot demonstrates palpable tenderness at the medial eminence of the navicular. He has a small prominence. There is mild edema with no erythema or ecchymosis present. He has palpable pedal pulses bilateral.  His sensation is grossly intact bilateral.    The remainder of the exam is unremarkable. RADIOGRAPHS: 3 weightbearing x-ray views of the right and left foot were taken. The left foot demonstrates a large exostosis in the approximate area of the second third TMT joints.   There is no acute periosteal reaction associated with this.  No acute fractures are noted. The right foot x-rays demonstrate a very large medial eminence on the navicular. There is a faint lucency at the medial aspect suggesting an accessory navicular. ASSESSMENT: Painful accessory navicular right foot. Exostosis left foot. PLAN: The patient was educated on the pathology and its treatment options. For the right foot issue, a high tide walker was applied to the patient's right lower extremity. The patient was instructed to remove the boot prior to driving. The boot is to be replaced before the patient if he gets out of the car upon completion of driving. It was recommended that the patient use the boot even at night for sleeping. All weightbearing activity is to be performed in the boot with the exception of driving. Overall activity is to be decreased. For the left foot exostosis, we will just continue to monitor this for any development of painful symptoms. I suspect this resulted from a previous trauma. It does not appear to be any type of aggressive bone lesion. I will see him back in 3 weeks. Procedures    Airselect Tall Pneumatic Walking Boot     Patient was prescribed a German IGGs Tall Walking Boot. The right foot will require stabilization / immobilization from this semi-rigid / rigid orthosis to improve their function. The orthosis will assist in protecting the affected area, provide functional support and facilitate healing. Patient was instructed to progress ambulation weight bearing as tolerated in the device. The patient was educated and fit by a healthcare professional with expert knowledge and specialization in brace application while under the direct supervision of the physician. Verbal and written instructions for the use of and application of this item were provided.    They were instructed to contact the office immediately should the brace result in increased pain, decreased sensation, increased swelling or worsening of the condition.

## 2020-02-06 ENCOUNTER — OFFICE VISIT (OUTPATIENT)
Dept: ORTHOPEDIC SURGERY | Age: 50
End: 2020-02-06
Payer: COMMERCIAL

## 2020-02-06 VITALS — RESPIRATION RATE: 16 BRPM | WEIGHT: 200.4 LBS | BODY MASS INDEX: 28.06 KG/M2 | HEIGHT: 71 IN

## 2020-02-06 PROCEDURE — 99213 OFFICE O/P EST LOW 20 MIN: CPT | Performed by: PODIATRIST

## 2020-07-17 ENCOUNTER — TELEPHONE (OUTPATIENT)
Dept: PRIMARY CARE CLINIC | Age: 50
End: 2020-07-17

## 2020-07-17 NOTE — TELEPHONE ENCOUNTER
----- Message from Alyse Letters sent at 7/17/2020 10:03 AM EDT -----  Subject: Appointment Request    QUESTIONS  Reason for appointment request? No appointments available during search  ---------------------------------------------------------------------------  --------------  [address]  What is the best way for the office to contact you? OK to leave message on   voicemail  Preferred Call Back Phone Number? [phone]  ---------------------------------------------------------------------------  --------------  SCRIPT ANSWERS  Relationship to Patient? Self  Appointment reason? Well Care/Follow Ups  Select a Well Care/Follow Ups appointment reason? Adult Physical Exam   [Medicare Annual Wellness   AWV   PAP   Pelvic]  (Is the patient requesting to be seen urgently for their symptoms?)? No  (If the patient is female   ask this question) Are you requesting a pap smear with your physical   exam? No  (Patient is Medicare and requesting Annual Wellness Visit)? No  Have you been diagnosed with   tested for   or told that you are suspected of having COVID-19 (Coronavirus)? No  Have you had a fever or taken medication to treat a fever within the past   3 days? No  Have you had a cough   shortness of breath or flu-like symptoms within the past 3 days? No  Do you currently have flu-like symptoms including fever or chills   cough   shortness of breath   or difficulty breathing   or new loss of taste or smell? No  (Service Expert  click yes below to proceed with [employer] As Usual   Scheduling)?  Yes

## 2020-09-21 ENCOUNTER — OFFICE VISIT (OUTPATIENT)
Dept: PRIMARY CARE CLINIC | Age: 50
End: 2020-09-21
Payer: COMMERCIAL

## 2020-09-21 VITALS
BODY MASS INDEX: 28.39 KG/M2 | TEMPERATURE: 97.7 F | WEIGHT: 202.8 LBS | HEART RATE: 69 BPM | DIASTOLIC BLOOD PRESSURE: 86 MMHG | SYSTOLIC BLOOD PRESSURE: 128 MMHG | OXYGEN SATURATION: 99 % | HEIGHT: 71 IN | RESPIRATION RATE: 16 BRPM

## 2020-09-21 LAB
BILIRUBIN, POC: NORMAL
BLOOD URINE, POC: NORMAL
CLARITY, POC: CLEAR
COLOR, POC: YELLOW
GLUCOSE URINE, POC: NORMAL
KETONES, POC: NORMAL
LEUKOCYTE EST, POC: NORMAL
NITRITE, POC: NORMAL
PH, POC: 6
PROTEIN, POC: NORMAL
SPECIFIC GRAVITY, POC: 1.02
UROBILINOGEN, POC: 0.2

## 2020-09-21 PROCEDURE — 90686 IIV4 VACC NO PRSV 0.5 ML IM: CPT | Performed by: INTERNAL MEDICINE

## 2020-09-21 PROCEDURE — 99396 PREV VISIT EST AGE 40-64: CPT | Performed by: INTERNAL MEDICINE

## 2020-09-21 PROCEDURE — 90471 IMMUNIZATION ADMIN: CPT | Performed by: INTERNAL MEDICINE

## 2020-09-21 PROCEDURE — 81002 URINALYSIS NONAUTO W/O SCOPE: CPT | Performed by: INTERNAL MEDICINE

## 2020-09-21 ASSESSMENT — ENCOUNTER SYMPTOMS
BACK PAIN: 0
COUGH: 0
VOMITING: 0
SORE THROAT: 0
ABDOMINAL DISTENTION: 0
SHORTNESS OF BREATH: 0
EYE REDNESS: 0
CHOKING: 0
EYE PAIN: 0
NAUSEA: 0
WHEEZING: 0
CHEST TIGHTNESS: 0
ABDOMINAL PAIN: 0
RECTAL PAIN: 0
BLOOD IN STOOL: 0
RHINORRHEA: 0
SINUS PRESSURE: 0
DIARRHEA: 0
CONSTIPATION: 0
TROUBLE SWALLOWING: 0
FACIAL SWELLING: 0
ANAL BLEEDING: 0
EYE DISCHARGE: 0
APNEA: 0
VOICE CHANGE: 0
EYE ITCHING: 0
PHOTOPHOBIA: 0

## 2020-09-21 NOTE — PATIENT INSTRUCTIONS
vaccine:  · Has had an allergic reaction after a previous dose of influenza vaccine, or has any severe, life-threatening allergies. · Has ever had Guillain-Barré Syndrome (also called GBS). In some cases, your health care provider may decide to postpone influenza vaccination to a future visit. People with minor illnesses, such as a cold, may be vaccinated. People who are moderately or severely ill should usually wait until they recover before getting influenza vaccine. Your health care provider can give you more information. Risks of a vaccine reaction  · Soreness, redness, and swelling where shot is given, fever, muscle aches, and headache can happen after influenza vaccine. · There may be a very small increased risk of Guillain-Barré Syndrome (GBS) after inactivated influenza vaccine (the flu shot). Kiana Pollack children who get the flu shot along with pneumococcal vaccine (PCV13), and/or DTaP vaccine at the same time might be slightly more likely to have a seizure caused by fever. Tell your health care provider if a child who is getting flu vaccine has ever had a seizure. People sometimes faint after medical procedures, including vaccination. Tell your provider if you feel dizzy or have vision changes or ringing in the ears. As with any medicine, there is a very remote chance of a vaccine causing a severe allergic reaction, other serious injury, or death. What if there is a serious problem? An allergic reaction could occur after the vaccinated person leaves the clinic. If you see signs of a severe allergic reaction (hives, swelling of the face and throat, difficulty breathing, a fast heartbeat, dizziness, or weakness), call 9-1-1 and get the person to the nearest hospital.  For other signs that concern you, call your health care provider. Adverse reactions should be reported to the Vaccine Adverse Event Reporting System (VAERS).  Your health care provider will usually file this report, or you can do it yourself. Visit the VAERS website at www.vaers. hhs.gov or call 3-320.668.8719. VAERS is only for reporting reactions, and VAERS staff do not give medical advice. The National Vaccine Injury Compensation Program  The National Vaccine Injury Compensation Program (VICP) is a federal program that was created to compensate people who may have been injured by certain vaccines. Visit the VICP website at www.[facility].gov/vaccinecompensation or call 2-120.734.2075 to learn about the program and about filing a claim. There is a time limit to file a claim for compensation. How can I learn more? · Ask your healthcare provider. · Call your local or state health department. · Contact the Centers for Disease Control and Prevention (CDC):  ? Call 8-686.948.1476 (1-800-CDC-INFO) or  ? Visit CDC's website at www.cdc.gov/flu  Vaccine Information Statement (Interim)  Inactivated Influenza Vaccine  8/15/2019  42 KATGertrudis Acuna [id number]  Department of Health and Human Services  Centers for Disease Control and Prevention  Many Vaccine Information Statements are available in [place]agasy and other languages. See www.immunize.org/vis. Muchas hojas de información sobre vacunas están disponibles en español y en otros idiomas. Visite www.immunize.org/vis. Care instructions adapted under license by [facility] [place] ([facility]). If you have questions about a medical condition or this instruction, always ask your healthcare professional. [place] [address] any warranty or liability for your use of this information. 1. Annual physical exam  - POCT Urinalysis no Micro  - CBC Auto Differential; Future  - Comprehensive Metabolic Panel; Future  - Lipid Panel; Future  - TSH without Reflex; Future  - Tdap done on 10/15/12  - Flu shot done today  - Regular aerobic exercise    2. Mixed hyperlipidemia  -Continue same medications  -Low fat, low cholesterol diet  -Regular aerobic exercise  - Lipid Panel; Future    3.  Prediabetes  -Low carbohydrate diet  -Regular aerobic exercise  - Hemoglobin A1C; Future    4. Allergic rhinitis, unspecified seasonality, unspecified trigger  -stable  -Continue same medications    5. Flu vaccine need  - INFLUENZA, QUADV, 3 YRS AND OLDER, IM PF, PREFILL SYR OR SDV, 0.5ML (SAMIR Caruso) given    Patient Education        Well Visit, Ages 25 to 48: Care Instructions  Your Care Instructions     Physical exams can help you stay healthy. Your doctor has checked your overall health and may have suggested ways to take good care of yourself. He or she also may have recommended tests. At home, you can help prevent illness with healthy eating, regular exercise, and other steps. Follow-up care is a key part of your treatment and safety. Be sure to make and go to all appointments, and call your doctor if you are having problems. It's also a good idea to know your test results and keep a list of the medicines you take. How can you care for yourself at home? · Reach and stay at a healthy weight. This will lower your risk for many problems, such as obesity, diabetes, heart disease, and high blood pressure. · Get at least 30 minutes of physical activity on most days of the week. Walking is a good choice. You also may want to do other activities, such as running, swimming, cycling, or playing tennis or team sports. Discuss any changes in your exercise program with your doctor. · Do not smoke or allow others to smoke around you. If you need help quitting, talk to your doctor about stop-smoking programs and medicines. These can increase your chances of quitting for good. · Talk to your doctor about whether you have any risk factors for sexually transmitted infections (STIs). Having one sex partner (who does not have STIs and does not have sex with anyone else) is a good way to avoid these infections. · Use birth control if you do not want to have children at this time.  Talk with your doctor about the choices available and what might be

## 2020-09-21 NOTE — PROGRESS NOTES
adenopathy. Does not bruise/bleed easily. Psychiatric/Behavioral: Negative for decreased concentration, dysphoric mood and sleep disturbance. The patient is not nervous/anxious. Past Medical History:   Diagnosis Date    Allergic rhinitis 10/03/2016    Hyperlipidemia     Influenza A 3/10/2016    Prediabetes 4/3/2017       Past Surgical History:   Procedure Laterality Date    HERNIA REPAIR  age 5   Sly Olp WISDOM TOOTH EXTRACTION  age 14       Outpatient Medications Marked as Taking for the 9/21/20 encounter (Office Visit) with Leticia Noonan MD   Medication Sig Dispense Refill    simvastatin (ZOCOR) 40 MG tablet Take 1 tablet by mouth nightly 90 tablet 1    ibuprofen (ADVIL;MOTRIN) 600 MG tablet Take 1 tablet by mouth 3 times daily as needed for Pain 30 tablet 0    aluminum chloride (HYPERCARE) 20 % external solution APPLY TOPICALLY TO THE AFFECTED AREA EVERY NIGHT AT BEDTIME 37.5 mL 5    fluticasone (FLONASE) 50 MCG/ACT nasal spray 2 sprays by Nasal route daily      MULTIPLE VITAMIN PO Take  by mouth.  FISH OIL Take 1,200 mg by mouth 2 times daily            No Known Allergies    Social History     Tobacco Use    Smoking status: Never Smoker    Smokeless tobacco: Never Used   Substance Use Topics    Alcohol use:  Yes     Alcohol/week: 2.0 standard drinks     Types: 2 Cans of beer per week       Family History   Problem Relation Age of Onset    Mult Sclerosis Mother 48    Heart Disease Father         3V CABG age 79    High Cholesterol Father     Mult Sclerosis Brother 28    Hypertension Neg Hx     Diabetes Neg Hx        Immunization History   Administered Date(s) Administered    Influenza 10/15/2012, 11/08/2013    Influenza Virus Vaccine 10/06/2011, 10/22/2014    Influenza, Intradermal, Preservative free 09/29/2015    Influenza, Intradermal, Quadrivalent, Preservative Free 10/03/2016, 10/19/2017    Influenza, Quadv, IM, PF (6 mo and older Fluzone, Flulaval, Fluarix, and 3 yrs and older Afluria) 09/19/2018, 09/24/2019, 09/21/2020    Tdap (Boostrix, Adacel) 10/15/2012       Vitals:    09/21/20 1605   BP: 128/86   Pulse: 69   Resp: 16   Temp: 97.7 °F (36.5 °C)   SpO2: 99%   Weight: 202 lb 12.8 oz (92 kg)   Height: 5' 10.98\" (1.803 m)     Body mass index is 28.3 kg/m². Physical Exam  Constitutional:       General: He is not in acute distress. Appearance: He is well-developed. HENT:      Head: Normocephalic and atraumatic. Right Ear: Hearing, tympanic membrane and ear canal normal.      Left Ear: Hearing, tympanic membrane and ear canal normal.      Nose: Nose normal.      Mouth/Throat:      Pharynx: Oropharynx is clear. Uvula midline. Eyes:      General: Lids are normal.      Extraocular Movements: Extraocular movements intact. Conjunctiva/sclera: Conjunctivae normal.      Pupils: Pupils are equal, round, and reactive to light. Neck:      Musculoskeletal: Neck supple. Thyroid: No thyromegaly. Vascular: No carotid bruit. Cardiovascular:      Rate and Rhythm: Normal rate and regular rhythm. Heart sounds: Normal heart sounds, S1 normal and S2 normal. No murmur. No friction rub. No gallop. Pulmonary:      Effort: Pulmonary effort is normal. No respiratory distress. Breath sounds: Normal breath sounds. No wheezing, rhonchi or rales. Abdominal:      General: Bowel sounds are normal. There is no distension. Palpations: Abdomen is soft. Tenderness: There is no abdominal tenderness. There is no guarding or rebound. Hernia: There is no hernia in the left inguinal area. Genitourinary:     Penis: Normal.       Scrotum/Testes: Normal.      Prostate: Normal.      Rectum: Normal.   Musculoskeletal: Normal range of motion. General: No tenderness. Right shoulder: He exhibits normal range of motion and no tenderness. Left shoulder: He exhibits normal range of motion and no tenderness.       Right elbow: He exhibits normal range

## 2020-09-23 DIAGNOSIS — Z00.00 ANNUAL PHYSICAL EXAM: ICD-10-CM

## 2020-09-23 DIAGNOSIS — E78.2 MIXED HYPERLIPIDEMIA: ICD-10-CM

## 2020-09-23 DIAGNOSIS — R73.03 PREDIABETES: ICD-10-CM

## 2020-09-23 LAB
A/G RATIO: 1.8 (ref 1.1–2.2)
ALBUMIN SERPL-MCNC: 4.7 G/DL (ref 3.4–5)
ALP BLD-CCNC: 73 U/L (ref 40–129)
ALT SERPL-CCNC: 26 U/L (ref 10–40)
ANION GAP SERPL CALCULATED.3IONS-SCNC: 11 MMOL/L (ref 3–16)
AST SERPL-CCNC: 23 U/L (ref 15–37)
BASOPHILS ABSOLUTE: 0 K/UL (ref 0–0.2)
BASOPHILS RELATIVE PERCENT: 0.7 %
BILIRUB SERPL-MCNC: 0.7 MG/DL (ref 0–1)
BUN BLDV-MCNC: 14 MG/DL (ref 7–20)
CALCIUM SERPL-MCNC: 10.2 MG/DL (ref 8.3–10.6)
CHLORIDE BLD-SCNC: 100 MMOL/L (ref 99–110)
CHOLESTEROL, TOTAL: 241 MG/DL (ref 0–199)
CO2: 28 MMOL/L (ref 21–32)
CREAT SERPL-MCNC: 1.1 MG/DL (ref 0.9–1.3)
EOSINOPHILS ABSOLUTE: 0.1 K/UL (ref 0–0.6)
EOSINOPHILS RELATIVE PERCENT: 1.4 %
ESTIMATED AVERAGE GLUCOSE: 134.1 MG/DL
GFR AFRICAN AMERICAN: >60
GFR NON-AFRICAN AMERICAN: >60
GLOBULIN: 2.6 G/DL
GLUCOSE BLD-MCNC: 118 MG/DL (ref 70–99)
HBA1C MFR BLD: 6.3 %
HCT VFR BLD CALC: 45.5 % (ref 40.5–52.5)
HDLC SERPL-MCNC: 56 MG/DL (ref 40–60)
HEMOGLOBIN: 14.9 G/DL (ref 13.5–17.5)
LDL CHOLESTEROL CALCULATED: 170 MG/DL
LYMPHOCYTES ABSOLUTE: 1.5 K/UL (ref 1–5.1)
LYMPHOCYTES RELATIVE PERCENT: 29.9 %
MCH RBC QN AUTO: 27.9 PG (ref 26–34)
MCHC RBC AUTO-ENTMCNC: 32.8 G/DL (ref 31–36)
MCV RBC AUTO: 85.1 FL (ref 80–100)
MONOCYTES ABSOLUTE: 0.4 K/UL (ref 0–1.3)
MONOCYTES RELATIVE PERCENT: 7.4 %
NEUTROPHILS ABSOLUTE: 3 K/UL (ref 1.7–7.7)
NEUTROPHILS RELATIVE PERCENT: 60.6 %
PDW BLD-RTO: 14.1 % (ref 12.4–15.4)
PLATELET # BLD: 186 K/UL (ref 135–450)
PMV BLD AUTO: 9.5 FL (ref 5–10.5)
POTASSIUM SERPL-SCNC: 4.3 MMOL/L (ref 3.5–5.1)
RBC # BLD: 5.34 M/UL (ref 4.2–5.9)
SODIUM BLD-SCNC: 139 MMOL/L (ref 136–145)
TOTAL PROTEIN: 7.3 G/DL (ref 6.4–8.2)
TRIGL SERPL-MCNC: 73 MG/DL (ref 0–150)
TSH SERPL DL<=0.05 MIU/L-ACNC: 2.13 UIU/ML (ref 0.27–4.2)
VLDLC SERPL CALC-MCNC: 15 MG/DL
WBC # BLD: 5 K/UL (ref 4–11)

## 2020-09-26 ASSESSMENT — ENCOUNTER SYMPTOMS: SINUS PAIN: 0

## 2020-10-09 RX ORDER — SIMVASTATIN 40 MG
40 TABLET ORAL NIGHTLY
Qty: 90 TABLET | Refills: 1 | Status: SHIPPED | OUTPATIENT
Start: 2020-10-09 | End: 2021-03-23

## 2020-10-09 NOTE — TELEPHONE ENCOUNTER
Medication:   Requested Prescriptions     Pending Prescriptions Disp Refills    simvastatin (ZOCOR) 40 MG tablet 90 tablet 1     Sig: Take 1 tablet by mouth nightly     Last Filled:  1/6/20    Last appt: 9/21/2020   Next appt: 3/23/2021    Last Lipid:   Lab Results   Component Value Date    CHOL 241 09/23/2020    TRIG 73 09/23/2020    HDL 56 09/23/2020    HDL 54 05/03/2012    LDLCALC 170 09/23/2020

## 2021-03-22 DIAGNOSIS — E78.2 MIXED HYPERLIPIDEMIA: ICD-10-CM

## 2021-03-23 RX ORDER — SIMVASTATIN 40 MG
TABLET ORAL
Qty: 90 TABLET | Refills: 1 | Status: SHIPPED | OUTPATIENT
Start: 2021-03-23 | End: 2021-11-22

## 2021-03-23 NOTE — TELEPHONE ENCOUNTER
Medication:   Requested Prescriptions     Pending Prescriptions Disp Refills    simvastatin (ZOCOR) 40 MG tablet [Pharmacy Med Name: SIMVASTATIN  TAB 40MG] 90 tablet 1     Sig: TAKE 1 TABLET NIGHTLY     Last Filled: 10.9.20    Last appt: 9/21/2020   Next appt: 5/11/2021    Last OARRS: No flowsheet data found.

## 2021-05-25 ENCOUNTER — OFFICE VISIT (OUTPATIENT)
Dept: PRIMARY CARE CLINIC | Age: 51
End: 2021-05-25
Payer: COMMERCIAL

## 2021-05-25 VITALS
HEIGHT: 72 IN | HEART RATE: 84 BPM | BODY MASS INDEX: 26.76 KG/M2 | TEMPERATURE: 98.2 F | WEIGHT: 197.6 LBS | OXYGEN SATURATION: 97 % | DIASTOLIC BLOOD PRESSURE: 80 MMHG | SYSTOLIC BLOOD PRESSURE: 138 MMHG

## 2021-05-25 DIAGNOSIS — E78.2 MIXED HYPERLIPIDEMIA: ICD-10-CM

## 2021-05-25 DIAGNOSIS — E78.2 MIXED HYPERLIPIDEMIA: Primary | ICD-10-CM

## 2021-05-25 DIAGNOSIS — R73.03 PREDIABETES: ICD-10-CM

## 2021-05-25 DIAGNOSIS — J30.9 ALLERGIC RHINITIS, UNSPECIFIED SEASONALITY, UNSPECIFIED TRIGGER: ICD-10-CM

## 2021-05-25 LAB
A/G RATIO: 1.8 (ref 1.1–2.2)
ALBUMIN SERPL-MCNC: 4.8 G/DL (ref 3.4–5)
ALP BLD-CCNC: 73 U/L (ref 40–129)
ALT SERPL-CCNC: 26 U/L (ref 10–40)
ANION GAP SERPL CALCULATED.3IONS-SCNC: 15 MMOL/L (ref 3–16)
AST SERPL-CCNC: 27 U/L (ref 15–37)
BILIRUB SERPL-MCNC: 0.8 MG/DL (ref 0–1)
BUN BLDV-MCNC: 14 MG/DL (ref 7–20)
CALCIUM SERPL-MCNC: 10 MG/DL (ref 8.3–10.6)
CHLORIDE BLD-SCNC: 100 MMOL/L (ref 99–110)
CHOLESTEROL, TOTAL: 180 MG/DL (ref 0–199)
CO2: 25 MMOL/L (ref 21–32)
CREAT SERPL-MCNC: 1 MG/DL (ref 0.9–1.3)
GFR AFRICAN AMERICAN: >60
GFR NON-AFRICAN AMERICAN: >60
GLOBULIN: 2.6 G/DL
GLUCOSE BLD-MCNC: 111 MG/DL (ref 70–99)
HBA1C MFR BLD: 6.2 %
HDLC SERPL-MCNC: 58 MG/DL (ref 40–60)
LDL CHOLESTEROL CALCULATED: 110 MG/DL
POTASSIUM SERPL-SCNC: 4.6 MMOL/L (ref 3.5–5.1)
SODIUM BLD-SCNC: 140 MMOL/L (ref 136–145)
TOTAL PROTEIN: 7.4 G/DL (ref 6.4–8.2)
TRIGL SERPL-MCNC: 61 MG/DL (ref 0–150)
VLDLC SERPL CALC-MCNC: 12 MG/DL

## 2021-05-25 PROCEDURE — 99214 OFFICE O/P EST MOD 30 MIN: CPT | Performed by: INTERNAL MEDICINE

## 2021-05-25 PROCEDURE — 83036 HEMOGLOBIN GLYCOSYLATED A1C: CPT | Performed by: INTERNAL MEDICINE

## 2021-05-25 RX ORDER — AMPICILLIN TRIHYDRATE 250 MG
1000 CAPSULE ORAL DAILY
COMMUNITY

## 2021-05-25 SDOH — ECONOMIC STABILITY: FOOD INSECURITY: WITHIN THE PAST 12 MONTHS, YOU WORRIED THAT YOUR FOOD WOULD RUN OUT BEFORE YOU GOT MONEY TO BUY MORE.: NEVER TRUE

## 2021-05-25 SDOH — ECONOMIC STABILITY: FOOD INSECURITY: WITHIN THE PAST 12 MONTHS, THE FOOD YOU BOUGHT JUST DIDN'T LAST AND YOU DIDN'T HAVE MONEY TO GET MORE.: NEVER TRUE

## 2021-05-25 ASSESSMENT — PATIENT HEALTH QUESTIONNAIRE - PHQ9
SUM OF ALL RESPONSES TO PHQ QUESTIONS 1-9: 0
SUM OF ALL RESPONSES TO PHQ9 QUESTIONS 1 & 2: 0
2. FEELING DOWN, DEPRESSED OR HOPELESS: 0
SUM OF ALL RESPONSES TO PHQ QUESTIONS 1-9: 0
SUM OF ALL RESPONSES TO PHQ QUESTIONS 1-9: 0

## 2021-05-25 ASSESSMENT — SOCIAL DETERMINANTS OF HEALTH (SDOH): HOW HARD IS IT FOR YOU TO PAY FOR THE VERY BASICS LIKE FOOD, HOUSING, MEDICAL CARE, AND HEATING?: NOT HARD AT ALL

## 2021-05-25 NOTE — PATIENT INSTRUCTIONS
-Continue same medications  -Low fat, low cholesterol diet  -low carbohydrate diet limit carbohydrates to 45 grams with meals and 15 with snacks  -Regular aerobic exercise

## 2021-05-25 NOTE — PROGRESS NOTES
Rut Ruano   Date ofBirth:  1970    Date of Visit:  5/25/2021    Chief Complaint   Patient presents with    Hyperlipidemia    Other     pre-diabetes    Allergic Rhinitis        HPI  Patient has hyperlipidemia. Patient takes simvastatin and has increased it back to 40 mg nightly after last labs. Patient decreases fat and cholesterol. Patient walks 2.5-3 miles 4-5 days per week. Patient has prediabetes. Patient takes cinnamon. Patient decreases carbohydrates. Patient has allergic rhinitis. Patient takes Flonase nasal spray. Patient states his allergies come and go and were bad earlier this year. Patient states his allergies have been good the past month. Patient states states he has stuffy nose, head congestion and headaches with allergies      Review of Systems   Constitutional: Negative for activity change, chills, fatigue and fever. HENT: Positive for congestion. Negative for ear pain, postnasal drip, rhinorrhea, sinus pressure, sinus pain, sneezing and sore throat. Eyes: Negative for visual disturbance. Respiratory: Negative for cough, chest tightness, shortness of breath and wheezing. Cardiovascular: Negative for chest pain, palpitations and leg swelling. Gastrointestinal: Negative for abdominal pain, constipation, diarrhea, nausea and vomiting. Genitourinary: Negative for dysuria, frequency and hematuria. Musculoskeletal: Negative for arthralgias and myalgias. Neurological: Positive for headaches. Negative for dizziness, syncope, light-headedness and numbness. Psychiatric/Behavioral: Negative for dysphoric mood and sleep disturbance. The patient is not nervous/anxious.         Allergies   Allergen Reactions    Seasonal Shortness Of Breath     Outpatient Medications Marked as Taking for the 5/25/21 encounter (Office Visit) with Kathryn Askew MD   Medication Sig Dispense Refill    Cinnamon 500 MG CAPS Take 1,000 mg by mouth daily      simvastatin (ZOCOR) 40 MG tablet TAKE 1 TABLET NIGHTLY 90 tablet 1    ibuprofen (ADVIL;MOTRIN) 600 MG tablet Take 1 tablet by mouth 3 times daily as needed for Pain 30 tablet 0    aluminum chloride (HYPERCARE) 20 % external solution APPLY TOPICALLY TO THE AFFECTED AREA EVERY NIGHT AT BEDTIME 37.5 mL 5    fluticasone (FLONASE) 50 MCG/ACT nasal spray 2 sprays by Nasal route daily      MULTIPLE VITAMIN PO Take  by mouth.  FISH OIL Take 1,200 mg by mouth 2 times daily            Vitals:    05/25/21 0926 05/25/21 0933   BP: (!) 140/80 138/80   Site: Right Upper Arm Right Upper Arm   Position: Sitting Sitting   Cuff Size: Large Adult Large Adult   Pulse: 84    Temp: 98.2 °F (36.8 °C)    TempSrc: Oral    SpO2: 97%    Weight: 197 lb 9.6 oz (89.6 kg)    Height: 6' (1.829 m)      Body mass index is 26.8 kg/m². Physical Exam  Nursing note reviewed. Constitutional:       General: He is not in acute distress. Appearance: Normal appearance. He is well-developed. HENT:      Right Ear: Tympanic membrane and ear canal normal.      Left Ear: Tympanic membrane and ear canal normal.      Nose:      Right Sinus: No maxillary sinus tenderness. Left Sinus: No maxillary sinus tenderness. Eyes:      General: Lids are normal.      Extraocular Movements: Extraocular movements intact. Conjunctiva/sclera: Conjunctivae normal.      Pupils: Pupils are equal, round, and reactive to light. Neck:      Thyroid: No thyromegaly. Vascular: No carotid bruit. Cardiovascular:      Rate and Rhythm: Normal rate and regular rhythm. Heart sounds: Normal heart sounds, S1 normal and S2 normal. No murmur heard. No friction rub. No gallop. Pulmonary:      Effort: Pulmonary effort is normal. No respiratory distress. Breath sounds: Normal breath sounds. No wheezing, rhonchi or rales. Abdominal:      General: Bowel sounds are normal. There is no distension. Palpations: Abdomen is soft. Tenderness:  There is no abdominal tenderness. Musculoskeletal:      Cervical back: Neck supple. Right lower leg: No edema. Left lower leg: No edema. Lymphadenopathy:      Head:      Right side of head: No submandibular adenopathy. Left side of head: No submandibular adenopathy. Neurological:      Mental Status: He is alert. Psychiatric:         Mood and Affect: Mood normal.           Results for POC orders placed in visit on 05/25/21   POCT glycosylated hemoglobin (Hb A1C)   Result Value Ref Range    Hemoglobin A1C 6.2 %     Lab Review   No visits with results within 6 Month(s) from this visit.    Latest known visit with results is:   Orders Only on 09/23/2020   Component Date Value    TSH 09/23/2020 2.13     Hemoglobin A1C 09/23/2020 6.3     eAG 09/23/2020 134.1     Cholesterol, Total 09/23/2020 241*    Triglycerides 09/23/2020 73     HDL 09/23/2020 56     LDL Calculated 09/23/2020 170*    VLDL Cholesterol Calcula* 09/23/2020 15     Sodium 09/23/2020 139     Potassium 09/23/2020 4.3     Chloride 09/23/2020 100     CO2 09/23/2020 28     Anion Gap 09/23/2020 11     Glucose 09/23/2020 118*    BUN 09/23/2020 14     CREATININE 09/23/2020 1.1     GFR Non- 09/23/2020 >60     GFR  09/23/2020 >60     Calcium 09/23/2020 10.2     Total Protein 09/23/2020 7.3     Albumin 09/23/2020 4.7     Albumin/Globulin Ratio 09/23/2020 1.8     Total Bilirubin 09/23/2020 0.7     Alkaline Phosphatase 09/23/2020 73     ALT 09/23/2020 26     AST 09/23/2020 23     Globulin 09/23/2020 2.6     WBC 09/23/2020 5.0     RBC 09/23/2020 5.34     Hemoglobin 09/23/2020 14.9     Hematocrit 09/23/2020 45.5     MCV 09/23/2020 85.1     MCH 09/23/2020 27.9     MCHC 09/23/2020 32.8     RDW 09/23/2020 14.1     Platelets 19/25/0987 186     MPV 09/23/2020 9.5     Neutrophils % 09/23/2020 60.6     Lymphocytes % 09/23/2020 29.9     Monocytes % 09/23/2020 7.4     Eosinophils % 09/23/2020 1.4     Basophils % 09/23/2020 0.7     Neutrophils Absolute 09/23/2020 3.0     Lymphocytes Absolute 09/23/2020 1.5     Monocytes Absolute 09/23/2020 0.4     Eosinophils Absolute 09/23/2020 0.1     Basophils Absolute 09/23/2020 0.0          Assessment/Plan     1. Mixed hyperlipidemia  - patient has increased Simvastatin back to 40mg nightly after last labs  - Continue same medications  - Low fat, low cholesterol diet  - Regular aerobic exercise  - Lipid Panel; Future  - Comprehensive Metabolic Panel; Future    2. Allergic rhinitis, unspecified seasonality, unspecified trigger  -stable  -Continue same medications    3. Prediabetes  -Hemoglobin A1c of 6.2%  -Low carbohydrate diet  -Regular aerobic exercise  -POCT glycosylated hemoglobin (Hb A1C)      Discussed medications with patient, who voiced understanding of their use and indications. All questions answered.       Return in about 4 months (around 9/25/2021) for annual physical exam.

## 2021-05-28 ASSESSMENT — ENCOUNTER SYMPTOMS
ABDOMINAL PAIN: 0
SORE THROAT: 0
COUGH: 0
VOMITING: 0
CHEST TIGHTNESS: 0
DIARRHEA: 0
SINUS PRESSURE: 0
SINUS PAIN: 0
SHORTNESS OF BREATH: 0
CONSTIPATION: 0
NAUSEA: 0
RHINORRHEA: 0
WHEEZING: 0

## 2021-09-28 ENCOUNTER — NURSE ONLY (OUTPATIENT)
Dept: PRIMARY CARE CLINIC | Age: 51
End: 2021-09-28
Payer: COMMERCIAL

## 2021-09-28 DIAGNOSIS — Z23 NEED FOR INFLUENZA VACCINATION: Primary | ICD-10-CM

## 2021-09-28 PROCEDURE — 90471 IMMUNIZATION ADMIN: CPT | Performed by: INTERNAL MEDICINE

## 2021-09-28 PROCEDURE — 90674 CCIIV4 VAC NO PRSV 0.5 ML IM: CPT | Performed by: INTERNAL MEDICINE

## 2021-11-20 DIAGNOSIS — E78.2 MIXED HYPERLIPIDEMIA: ICD-10-CM

## 2021-11-22 RX ORDER — SIMVASTATIN 40 MG
TABLET ORAL
Qty: 90 TABLET | Refills: 0 | Status: SHIPPED | OUTPATIENT
Start: 2021-11-22 | End: 2021-12-22 | Stop reason: SDUPTHER

## 2021-11-22 NOTE — TELEPHONE ENCOUNTER
Medication:   Requested Prescriptions     Pending Prescriptions Disp Refills    simvastatin (ZOCOR) 40 MG tablet [Pharmacy Med Name: SIMVASTATIN  TAB 40MG] 90 tablet 1     Sig: TAKE 1 TABLET NIGHTLY     Last Filled: 3.23.21    Last appt: 5/25/2021   Next appt: 12.23.21    Last OARRS: No flowsheet data found.

## 2021-12-22 ENCOUNTER — OFFICE VISIT (OUTPATIENT)
Dept: PRIMARY CARE CLINIC | Age: 51
End: 2021-12-22
Payer: COMMERCIAL

## 2021-12-22 VITALS
WEIGHT: 202 LBS | TEMPERATURE: 97.2 F | RESPIRATION RATE: 16 BRPM | HEIGHT: 72 IN | SYSTOLIC BLOOD PRESSURE: 132 MMHG | DIASTOLIC BLOOD PRESSURE: 88 MMHG | BODY MASS INDEX: 27.36 KG/M2 | HEART RATE: 78 BPM | OXYGEN SATURATION: 100 %

## 2021-12-22 DIAGNOSIS — Z11.59 NEED FOR HEPATITIS C SCREENING TEST: ICD-10-CM

## 2021-12-22 DIAGNOSIS — Z00.00 ENCOUNTER FOR WELL ADULT EXAM WITHOUT ABNORMAL FINDINGS: ICD-10-CM

## 2021-12-22 DIAGNOSIS — R61 HYPERHIDROSIS: ICD-10-CM

## 2021-12-22 DIAGNOSIS — Z12.11 SCREENING FOR COLON CANCER: ICD-10-CM

## 2021-12-22 DIAGNOSIS — E78.2 MIXED HYPERLIPIDEMIA: ICD-10-CM

## 2021-12-22 DIAGNOSIS — R73.03 PREDIABETES: ICD-10-CM

## 2021-12-22 DIAGNOSIS — Z12.5 SCREENING PSA (PROSTATE SPECIFIC ANTIGEN): ICD-10-CM

## 2021-12-22 DIAGNOSIS — Z00.00 ENCOUNTER FOR WELL ADULT EXAM WITHOUT ABNORMAL FINDINGS: Primary | ICD-10-CM

## 2021-12-22 LAB
A/G RATIO: 1.8 (ref 1.1–2.2)
ALBUMIN SERPL-MCNC: 4.7 G/DL (ref 3.4–5)
ALP BLD-CCNC: 78 U/L (ref 40–129)
ALT SERPL-CCNC: 28 U/L (ref 10–40)
ANION GAP SERPL CALCULATED.3IONS-SCNC: 15 MMOL/L (ref 3–16)
AST SERPL-CCNC: 28 U/L (ref 15–37)
BASOPHILS ABSOLUTE: 0 K/UL (ref 0–0.2)
BASOPHILS RELATIVE PERCENT: 0.5 %
BILIRUB SERPL-MCNC: 0.6 MG/DL (ref 0–1)
BUN BLDV-MCNC: 15 MG/DL (ref 7–20)
CALCIUM SERPL-MCNC: 10 MG/DL (ref 8.3–10.6)
CHLORIDE BLD-SCNC: 102 MMOL/L (ref 99–110)
CHOLESTEROL, TOTAL: 192 MG/DL (ref 0–199)
CO2: 24 MMOL/L (ref 21–32)
CREAT SERPL-MCNC: 1.2 MG/DL (ref 0.9–1.3)
EOSINOPHILS ABSOLUTE: 0 K/UL (ref 0–0.6)
EOSINOPHILS RELATIVE PERCENT: 0.7 %
GFR AFRICAN AMERICAN: >60
GFR NON-AFRICAN AMERICAN: >60
GLUCOSE BLD-MCNC: 94 MG/DL (ref 70–99)
HCT VFR BLD CALC: 44.7 % (ref 40.5–52.5)
HDLC SERPL-MCNC: 63 MG/DL (ref 40–60)
HEMOGLOBIN: 14.5 G/DL (ref 13.5–17.5)
HEPATITIS C ANTIBODY INTERPRETATION: NORMAL
LDL CHOLESTEROL CALCULATED: 118 MG/DL
LYMPHOCYTES ABSOLUTE: 1.9 K/UL (ref 1–5.1)
LYMPHOCYTES RELATIVE PERCENT: 29.3 %
MCH RBC QN AUTO: 28 PG (ref 26–34)
MCHC RBC AUTO-ENTMCNC: 32.5 G/DL (ref 31–36)
MCV RBC AUTO: 86 FL (ref 80–100)
MONOCYTES ABSOLUTE: 0.5 K/UL (ref 0–1.3)
MONOCYTES RELATIVE PERCENT: 7.9 %
NEUTROPHILS ABSOLUTE: 4 K/UL (ref 1.7–7.7)
NEUTROPHILS RELATIVE PERCENT: 61.6 %
PDW BLD-RTO: 14.6 % (ref 12.4–15.4)
PLATELET # BLD: 201 K/UL (ref 135–450)
PMV BLD AUTO: 9.1 FL (ref 5–10.5)
POTASSIUM SERPL-SCNC: 4.3 MMOL/L (ref 3.5–5.1)
PROSTATE SPECIFIC ANTIGEN: 0.74 NG/ML (ref 0–4)
RBC # BLD: 5.2 M/UL (ref 4.2–5.9)
SODIUM BLD-SCNC: 141 MMOL/L (ref 136–145)
TOTAL PROTEIN: 7.3 G/DL (ref 6.4–8.2)
TRIGL SERPL-MCNC: 57 MG/DL (ref 0–150)
TSH SERPL DL<=0.05 MIU/L-ACNC: 2.4 UIU/ML (ref 0.27–4.2)
VLDLC SERPL CALC-MCNC: 11 MG/DL
WBC # BLD: 6.5 K/UL (ref 4–11)

## 2021-12-22 PROCEDURE — 99396 PREV VISIT EST AGE 40-64: CPT | Performed by: INTERNAL MEDICINE

## 2021-12-22 RX ORDER — SIMVASTATIN 40 MG
TABLET ORAL
Qty: 90 TABLET | Refills: 2 | Status: SHIPPED | OUTPATIENT
Start: 2021-12-22 | End: 2022-08-10 | Stop reason: SDUPTHER

## 2021-12-22 ASSESSMENT — ENCOUNTER SYMPTOMS
APNEA: 0
RECTAL PAIN: 0
VOMITING: 0
SHORTNESS OF BREATH: 0
NAUSEA: 0
EYE ITCHING: 0
RHINORRHEA: 0
BLOOD IN STOOL: 0
EYE REDNESS: 0
EYE DISCHARGE: 0
DIARRHEA: 0
CHEST TIGHTNESS: 0
ANAL BLEEDING: 0
BACK PAIN: 0
CONSTIPATION: 0
ABDOMINAL PAIN: 0
PHOTOPHOBIA: 0
SINUS PAIN: 0
CHOKING: 0
SINUS PRESSURE: 0
FACIAL SWELLING: 0
ABDOMINAL DISTENTION: 0
SORE THROAT: 0
COUGH: 0
VOICE CHANGE: 0
TROUBLE SWALLOWING: 0
EYE PAIN: 0
WHEEZING: 0

## 2021-12-22 NOTE — PATIENT INSTRUCTIONS
Well Visit, Ages 25 to 48: Care Instructions  Overview     Well visits can help you stay healthy. Your doctor has checked your overall health and may have suggested ways to take good care of yourself. Your doctor also may have recommended tests. At home, you can help prevent illness with healthy eating, regular exercise, and other steps. Follow-up care is a key part of your treatment and safety. Be sure to make and go to all appointments, and call your doctor if you are having problems. It's also a good idea to know your test results and keep a list of the medicines you take. How can you care for yourself at home? · Get screening tests that you and your doctor decide on. Screening helps find diseases before any symptoms appear. · Eat healthy foods. Choose fruits, vegetables, whole grains, protein, and low-fat dairy foods. Limit fat, especially saturated fat. Reduce salt in your diet. · Limit alcohol. If you are a man, have no more than 2 drinks a day or 14 drinks a week. If you are a woman, have no more than 1 drink a day or 7 drinks a week. · Get at least 30 minutes of physical activity on most days of the week. Walking is a good choice. You also may want to do other activities, such as running, swimming, cycling, or playing tennis or team sports. Discuss any changes in your exercise program with your doctor. · Reach and stay at a healthy weight. This will lower your risk for many problems, such as obesity, diabetes, heart disease, and high blood pressure. · Do not smoke or allow others to smoke around you. If you need help quitting, talk to your doctor about stop-smoking programs and medicines. These can increase your chances of quitting for good. · Care for your mental health. It is easy to get weighed down by worry and stress. Learn strategies to manage stress, like deep breathing and mindfulness, and stay connected with your family and community.  If you find you often feel sad or hopeless, talk questions about a medical condition or this instruction, always ask your healthcare professional. [place] [id number] any warranty or liability for your use of this information.

## 2021-12-22 NOTE — PROGRESS NOTES
Well Adult Note  Name: Eun Patino Date: 2021   MRN: [id number] Sex: Male   Age: 54 y.o. Ethnicity: Non- / Non    : 1970 Race: Black /       Kathy Parisi is here for well adult exam.  History:    Patient presents for annual physical exam.    Patient has hyperlipidemia. Patient takes Simvastatin 40mg nightly. Patient decreases fat and cholesterol. Patient plays basketball 2 times per week and rides a Pel[employer] bike 2 -3 times per week. Patient has prediabetes. Patient decreases carbohydrates. Patient has allergic rhinitis. Patient states he has symptoms in spring and fall primarily. Patient doesn't use Flonase regularly. Patient gets stuffy nose and headache with allergies. Patient states he needs a refill for Drysol solution for his sweating. Review of Systems   Constitutional: Negative for activity change, appetite change, chills, diaphoresis, fatigue, fever and unexpected weight change. HENT: Negative for congestion, ear discharge, ear pain, facial swelling, hearing loss, mouth sores, nosebleeds, postnasal drip, rhinorrhea, sinus pressure, sinus pain, sneezing, sore throat, tinnitus, trouble swallowing and voice change. Eyes: Negative for photophobia, pain, discharge, redness, itching and visual disturbance. Respiratory: Negative for apnea, cough, choking, chest tightness, shortness of breath and wheezing. Cardiovascular: Negative for chest pain, palpitations and leg swelling. Gastrointestinal: Negative for abdominal distention, abdominal pain, anal bleeding, blood in stool, constipation, diarrhea, nausea, rectal pain and vomiting. Endocrine: Negative for cold intolerance and heat intolerance. Genitourinary: Negative for decreased urine volume, difficulty urinating, dysuria, flank pain, frequency, genital sores, hematuria, penile discharge, penile pain, penile swelling, scrotal swelling, testicular pain and urgency. Musculoskeletal: Negative for arthralgias, back pain, gait problem, joint swelling, myalgias, neck pain and neck stiffness. Skin: Negative for rash. Neurological: Negative for dizziness, tremors, seizures, syncope, facial asymmetry, speech difficulty, weakness, light-headedness, numbness and headaches. Hematological: Negative for adenopathy. Does not bruise/bleed easily. Psychiatric/Behavioral: Negative for decreased concentration, dysphoric mood and sleep disturbance. The patient is not nervous/anxious. Allergies   Allergen Reactions    Seasonal Shortness Of Breath         Prior to Visit Medications    Medication Sig Taking? Authorizing Provider   simvastatin (ZOCOR) 40 MG tablet TAKE 1 TABLET NIGHTLY Yes Alexandra Pagan MD   aluminum chloride (HYPERCARE) 20 % external solution APPLY TOPICALLY TO THE AFFECTED AREA EVERY NIGHT AT BEDTIME Yes Alexandra Pagan MD   Cinnamon 500 MG CAPS Take 1,000 mg by mouth daily Yes Historical Provider, MD   ibuprofen (ADVIL;MOTRIN) 600 MG tablet Take 1 tablet by mouth 3 times daily as needed for Pain Yes Alexandra Pagan MD   fluticasone (FLONASE) 50 MCG/ACT nasal spray 2 sprays by Nasal route daily Yes Alexandra Pagan MD   MULTIPLE VITAMIN PO Take  by mouth.  Yes Historical Provider, MD   FISH OIL Take 1,200 mg by mouth 2 times daily  Yes Historical Provider, MD         Past Medical History:   Diagnosis Date    Allergic rhinitis 10/03/2016    Hyperlipidemia     Influenza A 3/10/2016    Prediabetes 4/3/2017       Past Surgical History:   Procedure Laterality Date    HERNIA REPAIR  age 5   Theodoro Milder WISDOM TOOTH EXTRACTION  age 14         Family History   Problem Relation Age of Onset    Mult Sclerosis Mother 48    Heart Disease Father         3V CABG age 66    High Cholesterol Father     Mult Sclerosis Brother 28    Hypertension Neg Hx     Diabetes Neg Hx        Social History     Tobacco Use    Smoking status: Never Smoker    Smokeless tobacco: Never Used   Vaping Use    Vaping Use: Never used   Substance Use Topics    Alcohol use: Yes     Alcohol/week: 2.0 standard drinks     Types: 2 Cans of beer per week    Drug use: No       Objective   /88   Pulse 78   Temp 97.2 °F (36.2 °C)   Resp 16   Ht 6' (1.829 m)   Wt 202 lb (91.6 kg)   SpO2 100%   BMI 27.40 kg/m²   Wt Readings from Last 3 Encounters:   12/22/21 202 lb (91.6 kg)   05/25/21 197 lb 9.6 oz (89.6 kg)   09/21/20 202 lb 12.8 oz (92 kg)     Additional Measurements    12/22/21 1114   Waist (Inches): 35.5 in         Physical Exam  Constitutional:       General: He is not in acute distress. Appearance: Normal appearance. HENT:      Head: Normocephalic and atraumatic. Right Ear: Tympanic membrane, ear canal and external ear normal.      Left Ear: Tympanic membrane, ear canal and external ear normal.      Nose: Nose normal.   Eyes:      Extraocular Movements: Extraocular movements intact. Conjunctiva/sclera: Conjunctivae normal.      Pupils: Pupils are equal, round, and reactive to light. Neck:      Thyroid: No thyromegaly. Vascular: No carotid bruit. Cardiovascular:      Rate and Rhythm: Normal rate and regular rhythm. Heart sounds: Normal heart sounds, S1 normal and S2 normal. No murmur heard. Pulmonary:      Effort: Pulmonary effort is normal.      Breath sounds: Normal breath sounds. Abdominal:      General: Bowel sounds are normal. There is no distension. Palpations: Abdomen is soft. Tenderness: There is no abdominal tenderness. Hernia: There is no hernia in the left inguinal area or right inguinal area. Genitourinary:     Penis: Normal.       Testes: Normal.      Prostate: Normal.      Rectum: Normal.      Comments: Chaperone for Intimate Exam  Chaperone was offered as part of the rooming process. Patient declined and agrees to continue with exam without a chaperone  Musculoskeletal:      Right shoulder: No tenderness.  Normal range of motion. Left shoulder: No tenderness. Normal range of motion. Right elbow: No tenderness. Left elbow: No tenderness. Right wrist: No swelling or tenderness. Left wrist: No swelling or tenderness. Right hand: No swelling or tenderness. Left hand: No swelling or tenderness. Cervical back: Neck supple. No tenderness. Thoracic back: No tenderness. Lumbar back: No tenderness. Normal range of motion. Right hip: No tenderness. Left hip: No tenderness. Right knee: No tenderness. Left knee: No tenderness. Right lower leg: No edema. Left lower leg: No edema. Right ankle: No swelling. No tenderness. Left ankle: No swelling. No tenderness. Right foot: No swelling. Left foot: No swelling. Skin:     General: Skin is warm and dry. Neurological:      Mental Status: He is alert and oriented to person, place, and time. Deep Tendon Reflexes: Reflexes are normal and symmetric. Psychiatric:         Mood and Affect: Mood normal.         Speech: Speech normal.           Assessment   Plan      1. Encounter for well adult exam without abnormal findings  - CBC Auto Differential; Future  - Comprehensive Metabolic Panel; Future  - Lipid Panel; Future  - TSH without Reflex; Future  - Referral to GI for screening colonoscopy  -Tdap done on 10/15/12  - Flu shot done on 9/28/21  - Covid-19 vaccine done on 3/23/21, 4/20/21, and covid booster 12/22/21    2. Mixed hyperlipidemia  - continue simvastatin (ZOCOR) 40 MG tablet; TAKE 1 TABLET NIGHTLY  Dispense: 90 tablet; Refill: 2  -Low fat, low cholesterol diet  -Regular aerobic exercise  - Lipid Panel; Future    3. Prediabetes  -Low carbohydrate diet  -Regular aerobic exercise  - Hemoglobin A1C; Future    4. Hyperhidrosis  - aluminum chloride (HYPERCARE) 20 % external solution; APPLY TOPICALLY TO THE AFFECTED AREA EVERY NIGHT AT BEDTIME  Dispense: 37.5 mL; Refill: 5    5.  Need for hepatitis C screening test  - HEPATITIS C ANTIBODY; Future    6. Screening for colon cancer  - Referral to Vada Fothergill MD, Gastroenterology, [facility] for screening colonoscopy    7. Screening PSA (prostate specific antigen)  - PSA screening; Future               Personalized Preventive Plan   Current Health Maintenance Status  Immunization History   Administered Date(s) Administered    COVID-19, Danis Bobby, PF, 30mcg/0.3mL 03/23/2021, 04/20/2021, 12/22/2021    Influenza 10/15/2012, 11/08/2013    Influenza Virus Vaccine 10/06/2011, 10/22/2014    Influenza, Intradermal, Preservative free 09/29/2015    Influenza, Intradermal, Quadrivalent, Preservative Free 10/03/2016, 10/19/2017    Influenza, MDCK Quadv, IM, PF (Flucelvax 2 yrs and older) 09/28/2021    Influenza, Neida Arena, IM, PF (6 mo and older Fluzone, Flulaval, Fluarix, and 3 yrs and older Afluria) 09/19/2018, 09/24/2019, 09/21/2020    Tdap (Boostrix, Adacel) 10/15/2012        Health Maintenance   Topic Date Due    Colon Cancer Screen FIT/FOBT  10/03/2017    A1C test (Diabetic or Prediabetic)  03/22/2022    DTaP/Tdap/Td vaccine (2 - Td or Tdap) 10/15/2022    Lipid screen  12/22/2022    Flu vaccine  Completed    COVID-19 Vaccine  Completed    Hepatitis C screen  Completed    HIV screen  Completed    Hepatitis A vaccine  Aged Out    Hepatitis B vaccine  Aged Out    Hib vaccine  Aged Out    Meningococcal (ACWY) vaccine  Aged Out    Pneumococcal 0-64 years Vaccine  Aged Out     Recommendations for [facility] Due: see orders and patient instructions/AVS.    Return in about 6 months (around 6/22/2022) for hyperlipidemia, prediabetes, and allergic rhinitis.

## 2021-12-23 LAB
ESTIMATED AVERAGE GLUCOSE: 139.9 MG/DL
HBA1C MFR BLD: 6.5 %

## 2022-08-10 ENCOUNTER — OFFICE VISIT (OUTPATIENT)
Dept: PRIMARY CARE CLINIC | Age: 52
End: 2022-08-10
Payer: COMMERCIAL

## 2022-08-10 VITALS
TEMPERATURE: 97.2 F | HEART RATE: 92 BPM | OXYGEN SATURATION: 100 % | DIASTOLIC BLOOD PRESSURE: 80 MMHG | RESPIRATION RATE: 14 BRPM | WEIGHT: 195 LBS | HEIGHT: 72 IN | BODY MASS INDEX: 26.41 KG/M2 | SYSTOLIC BLOOD PRESSURE: 120 MMHG

## 2022-08-10 DIAGNOSIS — E78.2 MIXED HYPERLIPIDEMIA: Primary | ICD-10-CM

## 2022-08-10 DIAGNOSIS — Z23 NEED FOR SHINGLES VACCINE: ICD-10-CM

## 2022-08-10 DIAGNOSIS — J30.9 ALLERGIC RHINITIS, UNSPECIFIED SEASONALITY, UNSPECIFIED TRIGGER: ICD-10-CM

## 2022-08-10 DIAGNOSIS — R61 HYPERHIDROSIS: ICD-10-CM

## 2022-08-10 DIAGNOSIS — E78.2 MIXED HYPERLIPIDEMIA: ICD-10-CM

## 2022-08-10 DIAGNOSIS — E11.9 TYPE 2 DIABETES MELLITUS WITHOUT COMPLICATION, WITHOUT LONG-TERM CURRENT USE OF INSULIN (HCC): ICD-10-CM

## 2022-08-10 LAB
A/G RATIO: 2 (ref 1.1–2.2)
ALBUMIN SERPL-MCNC: 4.9 G/DL (ref 3.4–5)
ALP BLD-CCNC: 75 U/L (ref 40–129)
ALT SERPL-CCNC: 32 U/L (ref 10–40)
ANION GAP SERPL CALCULATED.3IONS-SCNC: 14 MMOL/L (ref 3–16)
AST SERPL-CCNC: 32 U/L (ref 15–37)
BILIRUB SERPL-MCNC: 0.8 MG/DL (ref 0–1)
BUN BLDV-MCNC: 15 MG/DL (ref 7–20)
CALCIUM SERPL-MCNC: 10.4 MG/DL (ref 8.3–10.6)
CHLORIDE BLD-SCNC: 100 MMOL/L (ref 99–110)
CHOLESTEROL, TOTAL: 229 MG/DL (ref 0–199)
CO2: 27 MMOL/L (ref 21–32)
CREAT SERPL-MCNC: 1.3 MG/DL (ref 0.9–1.3)
CREATININE URINE: 356.6 MG/DL (ref 39–259)
GFR AFRICAN AMERICAN: >60
GFR NON-AFRICAN AMERICAN: 58
GLUCOSE BLD-MCNC: 125 MG/DL (ref 70–99)
HBA1C MFR BLD: 6.2 %
HDLC SERPL-MCNC: 61 MG/DL (ref 40–60)
LDL CHOLESTEROL CALCULATED: 149 MG/DL
MICROALBUMIN UR-MCNC: 3 MG/DL
MICROALBUMIN/CREAT UR-RTO: 8.4 MG/G (ref 0–30)
POTASSIUM SERPL-SCNC: 4.1 MMOL/L (ref 3.5–5.1)
SODIUM BLD-SCNC: 141 MMOL/L (ref 136–145)
TOTAL PROTEIN: 7.3 G/DL (ref 6.4–8.2)
TRIGL SERPL-MCNC: 95 MG/DL (ref 0–150)
VLDLC SERPL CALC-MCNC: 19 MG/DL

## 2022-08-10 PROCEDURE — 90750 HZV VACC RECOMBINANT IM: CPT | Performed by: INTERNAL MEDICINE

## 2022-08-10 PROCEDURE — 90471 IMMUNIZATION ADMIN: CPT | Performed by: INTERNAL MEDICINE

## 2022-08-10 PROCEDURE — 99214 OFFICE O/P EST MOD 30 MIN: CPT | Performed by: INTERNAL MEDICINE

## 2022-08-10 PROCEDURE — 3044F HG A1C LEVEL LT 7.0%: CPT | Performed by: INTERNAL MEDICINE

## 2022-08-10 PROCEDURE — 83036 HEMOGLOBIN GLYCOSYLATED A1C: CPT | Performed by: INTERNAL MEDICINE

## 2022-08-10 RX ORDER — SIMVASTATIN 40 MG
TABLET ORAL
Qty: 90 TABLET | Refills: 1 | Status: SHIPPED | OUTPATIENT
Start: 2022-08-10 | End: 2022-08-22 | Stop reason: ALTCHOICE

## 2022-08-10 RX ORDER — GLUCOSAMINE HCL/CHONDROITIN SU 500-400 MG
CAPSULE ORAL
Qty: 100 STRIP | Refills: 3 | Status: SHIPPED | OUTPATIENT
Start: 2022-08-10

## 2022-08-10 RX ORDER — LANCETS 30 GAUGE
EACH MISCELLANEOUS
Qty: 100 EACH | Refills: 3 | Status: SHIPPED | OUTPATIENT
Start: 2022-08-10

## 2022-08-10 RX ORDER — DIPHENHYDRAMINE HYDROCHLORIDE 25 MG/1
CAPSULE, LIQUID FILLED ORAL
Qty: 1 KIT | Refills: 0 | Status: SHIPPED | OUTPATIENT
Start: 2022-08-10

## 2022-08-10 SDOH — HEALTH STABILITY: PHYSICAL HEALTH: ON AVERAGE, HOW MANY MINUTES DO YOU ENGAGE IN EXERCISE AT THIS LEVEL?: 60 MIN

## 2022-08-10 SDOH — ECONOMIC STABILITY: TRANSPORTATION INSECURITY
IN THE PAST 12 MONTHS, HAS LACK OF TRANSPORTATION KEPT YOU FROM MEETINGS, WORK, OR FROM GETTING THINGS NEEDED FOR DAILY LIVING?: NO

## 2022-08-10 SDOH — ECONOMIC STABILITY: FOOD INSECURITY: WITHIN THE PAST 12 MONTHS, YOU WORRIED THAT YOUR FOOD WOULD RUN OUT BEFORE YOU GOT MONEY TO BUY MORE.: NEVER TRUE

## 2022-08-10 SDOH — HEALTH STABILITY: PHYSICAL HEALTH: ON AVERAGE, HOW MANY DAYS PER WEEK DO YOU ENGAGE IN MODERATE TO STRENUOUS EXERCISE (LIKE A BRISK WALK)?: 4 DAYS

## 2022-08-10 SDOH — ECONOMIC STABILITY: HOUSING INSECURITY
IN THE LAST 12 MONTHS, WAS THERE A TIME WHEN YOU DID NOT HAVE A STEADY PLACE TO SLEEP OR SLEPT IN A SHELTER (INCLUDING NOW)?: NO

## 2022-08-10 SDOH — ECONOMIC STABILITY: INCOME INSECURITY: IN THE LAST 12 MONTHS, WAS THERE A TIME WHEN YOU WERE NOT ABLE TO PAY THE MORTGAGE OR RENT ON TIME?: NO

## 2022-08-10 SDOH — ECONOMIC STABILITY: TRANSPORTATION INSECURITY
IN THE PAST 12 MONTHS, HAS THE LACK OF TRANSPORTATION KEPT YOU FROM MEDICAL APPOINTMENTS OR FROM GETTING MEDICATIONS?: NO

## 2022-08-10 SDOH — ECONOMIC STABILITY: FOOD INSECURITY: WITHIN THE PAST 12 MONTHS, THE FOOD YOU BOUGHT JUST DIDN'T LAST AND YOU DIDN'T HAVE MONEY TO GET MORE.: NEVER TRUE

## 2022-08-10 SDOH — ECONOMIC STABILITY: HOUSING INSECURITY: IN THE LAST 12 MONTHS, HOW MANY PLACES HAVE YOU LIVED?: 1

## 2022-08-10 ASSESSMENT — SOCIAL DETERMINANTS OF HEALTH (SDOH)
DO YOU BELONG TO ANY CLUBS OR ORGANIZATIONS SUCH AS CHURCH GROUPS UNIONS, FRATERNAL OR ATHLETIC GROUPS, OR SCHOOL GROUPS?: NO
HOW HARD IS IT FOR YOU TO PAY FOR THE VERY BASICS LIKE FOOD, HOUSING, MEDICAL CARE, AND HEATING?: NOT HARD AT ALL
HOW OFTEN DO YOU ATTENT MEETINGS OF THE CLUB OR ORGANIZATION YOU BELONG TO?: NEVER
HOW OFTEN DO YOU GET TOGETHER WITH FRIENDS OR RELATIVES?: THREE TIMES A WEEK
IN A TYPICAL WEEK, HOW MANY TIMES DO YOU TALK ON THE PHONE WITH FAMILY, FRIENDS, OR NEIGHBORS?: THREE TIMES A WEEK
HOW OFTEN DO YOU ATTEND CHURCH OR RELIGIOUS SERVICES?: NEVER

## 2022-08-10 ASSESSMENT — LIFESTYLE VARIABLES
HOW OFTEN DO YOU HAVE A DRINK CONTAINING ALCOHOL: MONTHLY OR LESS
HOW MANY STANDARD DRINKS CONTAINING ALCOHOL DO YOU HAVE ON A TYPICAL DAY: 1 OR 2

## 2022-08-10 NOTE — PROGRESS NOTES
Artur Medrano   Date ofBirth:  1970    Date of Visit:  8/10/2022    Chief Complaint   Patient presents with    Hyperlipidemia    Allergic Rhinitis        HPI  Patient has hyperlipidemia. Patient takes Simvastatin 40mg nightly. Patient decreases fat and cholesterol. Patient plays basketball 2 times per week and has been doing physical work remodeling at home. Patient has new onset Type 2 Diabetes. Patient's last hemoglobin A1c was 6.5%. Patient decreases carbohydrates. Patient states he has an eye exam scheduled for next Friday. Patient has allergic rhinitis. Patient states he has symptoms in spring and fall primarily. Patient doesn't use Flonase regularly. Patient denies symptoms at present. Patient states he needs a refill for Drysol solution for his sweating. Patient states [facility] never had it in stock. Patient needs prescription sent to [email or web address]. Review of Systems   Constitutional:  Negative for activity change, chills, fatigue, fever and unexpected weight change. HENT:  Negative for congestion, ear pain, postnasal drip, rhinorrhea, sinus pressure, sinus pain, sneezing and sore throat. Eyes:  Negative for visual disturbance. Respiratory:  Negative for cough, chest tightness, shortness of breath and wheezing. Cardiovascular:  Negative for chest pain, palpitations and leg swelling. Gastrointestinal:  Negative for abdominal pain, constipation, diarrhea, nausea and vomiting. Genitourinary:  Negative for dysuria, frequency, hematuria and urgency. Musculoskeletal:  Negative for arthralgias and myalgias. Skin:  Negative for wound. Neurological:  Negative for dizziness, syncope, light-headedness, numbness and headaches. Psychiatric/Behavioral:  Negative for dysphoric mood and sleep disturbance. The patient is not nervous/anxious.       Allergies   Allergen Reactions    Seasonal Shortness Of Breath     Outpatient Medications Marked as Taking for the 8/10/22 encounter (Office Visit) with German Gunn MD   Medication Sig Dispense Refill    simvastatin (ZOCOR) 40 MG tablet TAKE 1 TABLET NIGHTLY 90 tablet 2    aluminum chloride (HYPERCARE) 20 % external solution APPLY TOPICALLY TO THE AFFECTED AREA EVERY NIGHT AT BEDTIME 37.5 mL 5    Blood Glucose Monitoring Suppl (BLOOD GLUCOSE MONITOR SYSTEM) w/Device KIT Dispense glucometer covered by patient's insurance 1 kit 0    blood glucose monitor strips Test once daily 100 strip 3    Lancets MISC Use to test blood sugar once daily 100 each 3    Cinnamon 500 MG CAPS Take 1,000 mg by mouth daily      ibuprofen (ADVIL;MOTRIN) 600 MG tablet Take 1 tablet by mouth 3 times daily as needed for Pain 30 tablet 0    fluticasone (FLONASE) 50 MCG/ACT nasal spray 2 sprays by Nasal route daily      MULTIPLE VITAMIN PO Take  by mouth. FISH OIL Take 1,200 mg by mouth 2 times daily            Vitals:    08/10/22 0814   BP: 120/80   Site: Right Upper Arm   Position: Sitting   Cuff Size: Medium Adult   Pulse: 92   Resp: 14   Temp: 97.2 °F (36.2 °C)   SpO2: 100%   Weight: 195 lb (88.5 kg)   Height: 6' (1.829 m)     Body mass index is 26.45 kg/m². Physical Exam  Nursing note reviewed. Constitutional:       General: He is not in acute distress. Appearance: Normal appearance. He is well-developed. Eyes:      General: Lids are normal.      Extraocular Movements: Extraocular movements intact. Conjunctiva/sclera: Conjunctivae normal.      Pupils: Pupils are equal, round, and reactive to light. Neck:      Thyroid: No thyromegaly. Vascular: No carotid bruit. Cardiovascular:      Rate and Rhythm: Normal rate and regular rhythm. Pulses: Normal pulses. Heart sounds: Normal heart sounds, S1 normal and S2 normal. No murmur heard. No friction rub. No gallop. Pulmonary:      Effort: Pulmonary effort is normal. No respiratory distress. Breath sounds: Normal breath sounds. No wheezing, rhonchi or rales. Abdominal:      General: Bowel sounds are normal. There is no distension. Palpations: Abdomen is soft. Tenderness: There is no abdominal tenderness. Musculoskeletal:      Cervical back: Neck supple. Right lower leg: No edema. Left lower leg: No edema. Lymphadenopathy:      Head:      Right side of head: No submandibular adenopathy. Left side of head: No submandibular adenopathy. Skin:     Comments: No foot ulcers     Neurological:      Mental Status: He is alert. Comments: Bilateral foot monofilament exam normal   Psychiatric:         Mood and Affect: Mood normal.         Results for POC orders placed in visit on 08/10/22   POCT glycosylated hemoglobin (Hb A1C)   Result Value Ref Range    Hemoglobin A1C 6.2 %     Lab Review   No visits with results within 6 Month(s) from this visit.    Latest known visit with results is:   Orders Only on 12/22/2021   Component Date Value    PSA 12/22/2021 0.74     Hep C Ab Interp 12/22/2021 Non-reactive     Hemoglobin A1C 12/22/2021 6.5     eAG 12/22/2021 139.9     TSH 12/22/2021 2.40     Cholesterol, Total 12/22/2021 192     Triglycerides 12/22/2021 57     HDL 12/22/2021 63 (A)    LDL Calculated 12/22/2021 118 (A)    VLDL Cholesterol Calcula* 12/22/2021 11     Sodium 12/22/2021 141     Potassium 12/22/2021 4.3     Chloride 12/22/2021 102     CO2 12/22/2021 24     Anion Gap 12/22/2021 15     Glucose 12/22/2021 94     BUN 12/22/2021 15     Creatinine 12/22/2021 1.2     GFR Non- 12/22/2021 >60     GFR  12/22/2021 >60     Calcium 12/22/2021 10.0     Total Protein 12/22/2021 7.3     Albumin 12/22/2021 4.7     Albumin/Globulin Ratio 12/22/2021 1.8     Total Bilirubin 12/22/2021 0.6     Alkaline Phosphatase 12/22/2021 78     ALT 12/22/2021 28     AST 12/22/2021 28     WBC 12/22/2021 6.5     RBC 12/22/2021 5.20     Hemoglobin 12/22/2021 14.5     Hematocrit 12/22/2021 44.7     MCV 12/22/2021 86.0     MCH 12/22/2021 28.0 MCHC 12/22/2021 32.5     RDW 12/22/2021 14.6     Platelets 22/00/9363 201     MPV 12/22/2021 9.1     Neutrophils % 12/22/2021 61.6     Lymphocytes % 12/22/2021 29.3     Monocytes % 12/22/2021 7.9     Eosinophils % 12/22/2021 0.7     Basophils % 12/22/2021 0.5     Neutrophils Absolute 12/22/2021 4.0     Lymphocytes Absolute 12/22/2021 1.9     Monocytes Absolute 12/22/2021 0.5     Eosinophils Absolute 12/22/2021 0.0     Basophils Absolute 12/22/2021 0.0          Assessment/Plan     1. Mixed hyperlipidemia  -LDL is near goal  -Continue simvastatin (ZOCOR) 40 MG tablet; TAKE 1 TABLET NIGHTLY  Dispense: 90 tablet; Refill: 1  -Low fat, low cholesterol diet  -Regular aerobic exercise  - Comprehensive Metabolic Panel; Future  - Lipid Panel; Future    2. Type 2 diabetes mellitus without complication, without long-term current use of insulin ([facility])  -Hemoglobin A1c of 6.2% shows diabetes is well controlled and improved  -Continue diet controltions  -Limit carbohydrates to 45 grams with meals and 15 grams with snacks  -monitor blood sugars  -goal for blood sugar fasting or pre-meal  is   -goal for blood sugar 2 hours after a meal is less than 180  -goal for blood sugar at bedtime is less than 150  -Regular aerobic exercise  -  DIABETES FOOT EXAM  - POCT glycosylated hemoglobin (Hb A1C)  -Referral to [facility] Individual Diabetes Education (Non Care Coord Patient), [facility]  - Comprehensive Metabolic Panel; Future  - Microalbumin / Creatinine Urine Ratio  - Blood Glucose Monitoring Suppl (BLOOD GLUCOSE MONITOR SYSTEM) w/Device KIT; Dispense glucometer covered by patient's insurance  Dispense: 1 kit; Refill: 0  - blood glucose monitor strips; Test once daily  Dispense: 100 strip; Refill: 3  - Lancets MISC; Use to test blood sugar once daily  Dispense: 100 each; Refill: 3    3.  Allergic rhinitis, unspecified seasonality, unspecified trigger  -stable  -Continue Flonase nasal spray 2 sprays each nostril once daily as needed    4. Hyperhidrosis  -Stable  -Continue aluminum chloride (HYPERCARE) 20 % external solution; APPLY TOPICALLY TO THE AFFECTED AREA EVERY NIGHT AT BEDTIME  Dispense: 105 mL; Refill: 1    5. Need for shingles vaccine  - Zoster, SHINGRIX, (18 yrs +), IM given        Discussed medications with patient, who voiced understanding of their use and indications. All questions answered.       Return in about 4 months (around 12/22/2022) for annual physical exam.

## 2022-08-21 PROBLEM — E11.9 TYPE 2 DIABETES MELLITUS WITHOUT COMPLICATION, WITHOUT LONG-TERM CURRENT USE OF INSULIN (HCC): Status: ACTIVE | Noted: 2022-08-21

## 2022-08-21 ASSESSMENT — ENCOUNTER SYMPTOMS
SHORTNESS OF BREATH: 0
SINUS PRESSURE: 0
CONSTIPATION: 0
DIARRHEA: 0
SORE THROAT: 0
SINUS PAIN: 0
CHEST TIGHTNESS: 0
COUGH: 0
RHINORRHEA: 0
VOMITING: 0
ABDOMINAL PAIN: 0
NAUSEA: 0
WHEEZING: 0

## 2022-08-22 DIAGNOSIS — E78.2 MIXED HYPERLIPIDEMIA: Primary | ICD-10-CM

## 2022-08-22 DIAGNOSIS — R80.9 MICROALBUMINURIA: ICD-10-CM

## 2022-08-22 RX ORDER — LISINOPRIL 5 MG/1
5 TABLET ORAL DAILY
Qty: 30 TABLET | Refills: 5 | Status: SHIPPED | OUTPATIENT
Start: 2022-08-22

## 2022-08-22 RX ORDER — ROSUVASTATIN CALCIUM 40 MG/1
40 TABLET, COATED ORAL DAILY
Qty: 90 TABLET | Refills: 1 | Status: SHIPPED | OUTPATIENT
Start: 2022-08-22

## 2022-10-04 ENCOUNTER — TELEPHONE (OUTPATIENT)
Dept: PRIMARY CARE CLINIC | Age: 52
End: 2022-10-04

## 2022-10-04 NOTE — TELEPHONE ENCOUNTER
Called and spoke with patient, let him know he is not on Lisinopril 5mg for high blood pressure. He is on it for microalbumnuria and that he should continue to take this medication. Patient verbalized understanding.

## 2022-10-04 NOTE — TELEPHONE ENCOUNTER
Patient not aware of blood pressure issue. Dr did not advise patient needed blood pressure medication during last visit.       Please advise whether patient needs to get the lisinopril (PRINIVIL;ZESTRIL) 5 MG tablet  Filled    Please advise

## 2022-10-20 ENCOUNTER — NURSE ONLY (OUTPATIENT)
Dept: PRIMARY CARE CLINIC | Age: 52
End: 2022-10-20
Payer: COMMERCIAL

## 2022-10-20 DIAGNOSIS — Z23 NEED FOR INFLUENZA VACCINATION: Primary | ICD-10-CM

## 2022-10-20 PROCEDURE — 90674 CCIIV4 VAC NO PRSV 0.5 ML IM: CPT | Performed by: INTERNAL MEDICINE

## 2022-10-20 PROCEDURE — 90471 IMMUNIZATION ADMIN: CPT | Performed by: INTERNAL MEDICINE

## 2022-11-08 NOTE — PROGRESS NOTES
Medical Nutrition Therapy for Diabetes    Stephanie Kelsey  November 8, 2022      Patient Care Team:  Mady Dunne MD as PCP - General  Mady Dunne MD as PCP - [facility] Empaneled Provider    Reason for visit: Type 2 Diabetes    ASSESSMENT/PLAN:   NUTRITION DIAGNOSIS  Initial Visit    #1 Problem: Altered Nutrition-Related Laboratory Values (NC-2.2)  Related to: Endocrine/Diabetes   As Evidenced by: Elevated Plasma glucose and/or HgbA1c levels          #2 Problem: Excessive Carbohydrate Intake (NI-5.8. 2)  Related to: Food-and nutrition-related knowledge deficit concerning appropriate amount of carbohydrate intake  As evidenced by: Estimated carbohydrate intake that is consistently more than the recommended amounts     #3 Problem: Knowledge and Beliefs-NB-3.1                       Food and nutrition deficits     NUTRITION INTERVENTION  Nutrition Prescription: 30 grams carbohydrate per meal with protein and non-starch vegetables  15 gram carbohydrate snacks     Diabetes Education/Counseling included: Pathophysiology of Diabetes   Carbohydrate Control, Activity/exercise, and Monitoring, Benefits of adequate hydration, quality sleep and stress management   Discussed benefits of including protein with limited carbohydrate each meal.  Interventions:  Control Carbohydrate Intake using Plate Guide, Control Carbohydrate Intake using Carb Counting, Increase intake of vegetables, Decrease intake of foods high in saturated fat, and Decrease intake of high-sodium foods  Recommended including protein with each meal and 30 gram carbohydrates. Add  non-starch vegetables with at least 2 meals daily. Encouraged to continue good eating schedule, high activity level and nutrient dense food selections.   NUTRITION MONITORING AND EVALUATION  3 meals and snacks  30 gram carbohydrate meals with protein       Patient Active Problem List   Diagnosis    Hyperlipidemia    Hyperhidrosis    Allergic rhinitis    Prediabetes    Mixed hyperlipidemia    Type 2 diabetes mellitus without complication, without long-term current use of insulin (HCC)       Current Outpatient Medications   Medication Sig Dispense Refill    rosuvastatin (CRESTOR) 40 MG tablet Take 1 tablet by mouth daily 90 tablet 1    lisinopril (PRINIVIL;ZESTRIL) 5 MG tablet Take 1 tablet by mouth daily 30 tablet 5    aluminum chloride (HYPERCARE) 20 % external solution APPLY TOPICALLY TO THE AFFECTED AREA EVERY NIGHT AT BEDTIME 105 mL 1    Blood Glucose Monitoring Suppl (BLOOD GLUCOSE MONITOR SYSTEM) w/Device KIT Dispense glucometer covered by patient's insurance 1 kit 0    blood glucose monitor strips Test once daily 100 strip 3    Lancets MISC Use to test blood sugar once daily 100 each 3    Cinnamon 500 MG CAPS Take 1,000 mg by mouth daily      ibuprofen (ADVIL;MOTRIN) 600 MG tablet Take 1 tablet by mouth 3 times daily as needed for Pain 30 tablet 0    fluticasone (FLONASE) 50 MCG/ACT nasal spray 2 sprays by Nasal route daily      MULTIPLE VITAMIN PO Take  by mouth. FISH OIL Take 1,200 mg by mouth 2 times daily        No current facility-administered medications for this visit.          NUTRITION ASSESSMENT    Biochemical Data:    Lab Results   Component Value Date    LABA1C 6.2 08/10/2022     Lab Results   Component Value Date    .9 12/22/2021       Lab Results   Component Value Date    CHOL 229 (H) 08/10/2022    CHOL 192 12/22/2021    CHOL 180 05/25/2021     Lab Results   Component Value Date    TRIG 95 08/10/2022    TRIG 57 12/22/2021    TRIG 61 05/25/2021     Lab Results   Component Value Date    HDL 61 (H) 08/10/2022    HDL 63 (H) 12/22/2021    HDL 58 05/25/2021     Lab Results   Component Value Date    LDLCALC 149 (H) 08/10/2022    LDLCALC 118 (H) 12/22/2021    LDLCALC 110 (H) 05/25/2021     Lab Results   Component Value Date    LABVLDL 19 08/10/2022    LABVLDL 11 12/22/2021    LABVLDL 12 05/25/2021     No results found for: [facility]    Lab Results   Component Value Date    WBC 6.5 12/22/2021    HGB 14.5 12/22/2021    HCT 44.7 12/22/2021    MCV 86.0 12/22/2021     12/22/2021       Lab Results   Component Value Date    CREATININE 1.3 08/10/2022    BUN 15 08/10/2022     08/10/2022    K 4.1 08/10/2022     08/10/2022    CO2 27 08/10/2022       Diabetes Medications: No  Knows name and dose of prescribed medications   Knows prescribed schedule for medications  Recent change in medication type/dosage:   Stores  medications properly  Comments:     Monitoring:   Has BG meter: Yes  Testing frequency: 1/day  Recent results: fasting avg 110, , after meals 155  Hypoglycemia? No    Anthropometric Measurements: Wt:   Wt Readings from Last 3 Encounters:   08/10/22 195 lb (88.5 kg)   12/22/21 202 lb (91.6 kg)   05/25/21 197 lb 9.6 oz (89.6 kg)      BMI:   BMI Readings from Last 3 Encounters:   08/10/22 26.45 kg/m²   12/22/21 27.40 kg/m²   05/25/21 26.80 kg/m²     Patient's stated goal weight:   7% Weight loss goal weight:     Physical Activity History:   Physical activity: basketball/walking (sporadic), yard work, [place] bike  Frequency of activity: 2-3/week  Duration of activity: 60 minutes  Obstacles to activity: none    Sleep: good, unremarkable    Food and Nutrition History:   Nutrition Awareness/Previous DSMES: yes, pre-diabetes  Number of people in household: 5  Frequency of Meals Eaten away from home:1-2/week  Food Availability Problems  Within the past 12 months, have you worried that your food would run out before you got money to buy more? No  Within the past 12 months, has the food you bought not lasted till the end of the month and you didn't have money to get more?  No  Beverage consumption: water - 50 ounces, iced tea -1 cup, regular pop - infrequent-8 ounce cans-1, sweet tea sometimes 8-12 / week  Alcohol consumption: beer - 1-2/week-12 ounces  Usual Food consumption:   7:30 -8:30 am grape nuts, yogurt, blueberries/cheerios/Raisin Bran/oats/egg, toast, turkey sausage  Snack tea, maybe banana  11:30-1 pm [place], chips, fruit/Salad-almonds, cranberries/small pizza/hot dog, chips and fruit  May not have snack-Kind bar/string cheese/chips/crackers  5:30 - 6:30 pm shrimp, broccoli, au grautin potatoes   Infrequent snack-chips and salsa    Barriers:   -none          Follow Up Plan: Will remain available. Contact information given.      Referring Provider: Otila Sandhoff, MD  Time spent with patient: 60 minutes

## 2022-11-09 ENCOUNTER — OFFICE VISIT (OUTPATIENT)
Dept: ENDOCRINOLOGY | Age: 52
End: 2022-11-09
Payer: COMMERCIAL

## 2022-11-09 DIAGNOSIS — E11.9 TYPE 2 DIABETES MELLITUS WITHOUT COMPLICATION, WITHOUT LONG-TERM CURRENT USE OF INSULIN (HCC): Primary | ICD-10-CM

## 2022-11-09 PROCEDURE — 3044F HG A1C LEVEL LT 7.0%: CPT | Performed by: DIETITIAN, REGISTERED

## 2022-11-09 PROCEDURE — 97802 MEDICAL NUTRITION INDIV IN: CPT | Performed by: DIETITIAN, REGISTERED

## 2023-01-23 ENCOUNTER — OFFICE VISIT (OUTPATIENT)
Dept: PRIMARY CARE CLINIC | Age: 53
End: 2023-01-23

## 2023-01-23 VITALS
SYSTOLIC BLOOD PRESSURE: 110 MMHG | OXYGEN SATURATION: 98 % | RESPIRATION RATE: 16 BRPM | HEIGHT: 72 IN | WEIGHT: 190 LBS | BODY MASS INDEX: 25.73 KG/M2 | HEART RATE: 86 BPM | DIASTOLIC BLOOD PRESSURE: 80 MMHG

## 2023-01-23 DIAGNOSIS — J30.9 ALLERGIC RHINITIS, UNSPECIFIED SEASONALITY, UNSPECIFIED TRIGGER: ICD-10-CM

## 2023-01-23 DIAGNOSIS — Z23 NEED FOR TDAP VACCINATION: ICD-10-CM

## 2023-01-23 DIAGNOSIS — Z12.5 SCREENING PSA (PROSTATE SPECIFIC ANTIGEN): ICD-10-CM

## 2023-01-23 DIAGNOSIS — E78.2 MIXED HYPERLIPIDEMIA: ICD-10-CM

## 2023-01-23 DIAGNOSIS — Z13.6 SCREENING FOR ISCHEMIC HEART DISEASE: ICD-10-CM

## 2023-01-23 DIAGNOSIS — E11.9 TYPE 2 DIABETES MELLITUS WITHOUT COMPLICATION, WITHOUT LONG-TERM CURRENT USE OF INSULIN (HCC): ICD-10-CM

## 2023-01-23 DIAGNOSIS — R80.9 MICROALBUMINURIA: ICD-10-CM

## 2023-01-23 DIAGNOSIS — Z23 NEED FOR SHINGLES VACCINE: ICD-10-CM

## 2023-01-23 DIAGNOSIS — M25.522 LEFT ELBOW PAIN: ICD-10-CM

## 2023-01-23 DIAGNOSIS — R94.31 ABNORMAL EKG: ICD-10-CM

## 2023-01-23 DIAGNOSIS — Z00.00 ENCOUNTER FOR WELL ADULT EXAM WITHOUT ABNORMAL FINDINGS: Primary | ICD-10-CM

## 2023-01-23 DIAGNOSIS — Z00.00 ENCOUNTER FOR WELL ADULT EXAM WITHOUT ABNORMAL FINDINGS: ICD-10-CM

## 2023-01-23 LAB
A/G RATIO: 1.8 (ref 1.1–2.2)
ALBUMIN SERPL-MCNC: 4.4 G/DL (ref 3.4–5)
ALP BLD-CCNC: 67 U/L (ref 40–129)
ALT SERPL-CCNC: 23 U/L (ref 10–40)
ANION GAP SERPL CALCULATED.3IONS-SCNC: 9 MMOL/L (ref 3–16)
AST SERPL-CCNC: 25 U/L (ref 15–37)
BASOPHILS ABSOLUTE: 0 K/UL (ref 0–0.2)
BASOPHILS RELATIVE PERCENT: 0.5 %
BILIRUB SERPL-MCNC: 0.8 MG/DL (ref 0–1)
BILIRUBIN URINE: NEGATIVE
BLOOD, URINE: NEGATIVE
BUN BLDV-MCNC: 16 MG/DL (ref 7–20)
CALCIUM SERPL-MCNC: 9.8 MG/DL (ref 8.3–10.6)
CHLORIDE BLD-SCNC: 101 MMOL/L (ref 99–110)
CHOLESTEROL, TOTAL: 173 MG/DL (ref 0–199)
CLARITY: CLEAR
CO2: 30 MMOL/L (ref 21–32)
COLOR: YELLOW
CREAT SERPL-MCNC: 1 MG/DL (ref 0.9–1.3)
CREATININE URINE: 148.4 MG/DL (ref 39–259)
EOSINOPHILS ABSOLUTE: 0.1 K/UL (ref 0–0.6)
EOSINOPHILS RELATIVE PERCENT: 2.3 %
GFR SERPL CREATININE-BSD FRML MDRD: >60 ML/MIN/{1.73_M2}
GLUCOSE BLD-MCNC: 116 MG/DL (ref 70–99)
GLUCOSE URINE: NEGATIVE MG/DL
HBA1C MFR BLD: 6.2 %
HCT VFR BLD CALC: 43.3 % (ref 40.5–52.5)
HDLC SERPL-MCNC: 64 MG/DL (ref 40–60)
HEMOGLOBIN: 13.9 G/DL (ref 13.5–17.5)
KETONES, URINE: NEGATIVE MG/DL
LDL CHOLESTEROL CALCULATED: 99 MG/DL
LEUKOCYTE ESTERASE, URINE: NEGATIVE
LYMPHOCYTES ABSOLUTE: 1.3 K/UL (ref 1–5.1)
LYMPHOCYTES RELATIVE PERCENT: 26 %
MCH RBC QN AUTO: 27.9 PG (ref 26–34)
MCHC RBC AUTO-ENTMCNC: 32.2 G/DL (ref 31–36)
MCV RBC AUTO: 86.8 FL (ref 80–100)
MICROALBUMIN UR-MCNC: <1.2 MG/DL
MICROALBUMIN/CREAT UR-RTO: NORMAL MG/G (ref 0–30)
MICROSCOPIC EXAMINATION: NORMAL
MONOCYTES ABSOLUTE: 0.4 K/UL (ref 0–1.3)
MONOCYTES RELATIVE PERCENT: 8.1 %
NEUTROPHILS ABSOLUTE: 3.1 K/UL (ref 1.7–7.7)
NEUTROPHILS RELATIVE PERCENT: 63.1 %
NITRITE, URINE: NEGATIVE
PDW BLD-RTO: 14.4 % (ref 12.4–15.4)
PH UA: 6 (ref 5–8)
PLATELET # BLD: 160 K/UL (ref 135–450)
PMV BLD AUTO: 9.7 FL (ref 5–10.5)
POTASSIUM SERPL-SCNC: 4 MMOL/L (ref 3.5–5.1)
PROSTATE SPECIFIC ANTIGEN: 0.83 NG/ML (ref 0–4)
PROTEIN UA: NEGATIVE MG/DL
RBC # BLD: 4.99 M/UL (ref 4.2–5.9)
SODIUM BLD-SCNC: 140 MMOL/L (ref 136–145)
SPECIFIC GRAVITY UA: 1.02 (ref 1–1.03)
TOTAL PROTEIN: 6.8 G/DL (ref 6.4–8.2)
TRIGL SERPL-MCNC: 52 MG/DL (ref 0–150)
TSH SERPL DL<=0.05 MIU/L-ACNC: 2.36 UIU/ML (ref 0.27–4.2)
URINE TYPE: NORMAL
UROBILINOGEN, URINE: 0.2 E.U./DL
VLDLC SERPL CALC-MCNC: 10 MG/DL
WBC # BLD: 4.9 K/UL (ref 4–11)

## 2023-01-23 RX ORDER — ROSUVASTATIN CALCIUM 40 MG/1
40 TABLET, COATED ORAL DAILY
Qty: 90 TABLET | Refills: 1 | Status: SHIPPED | OUTPATIENT
Start: 2023-01-23

## 2023-01-23 RX ORDER — LISINOPRIL 5 MG/1
5 TABLET ORAL DAILY
Qty: 90 TABLET | Refills: 1 | Status: SHIPPED | OUTPATIENT
Start: 2023-01-23

## 2023-01-23 ASSESSMENT — ENCOUNTER SYMPTOMS
SORE THROAT: 0
RHINORRHEA: 0
ABDOMINAL PAIN: 0
EYE PAIN: 0
RECTAL PAIN: 0
CHEST TIGHTNESS: 0
PHOTOPHOBIA: 0
SINUS PRESSURE: 0
SINUS PAIN: 0
ABDOMINAL DISTENTION: 0
NAUSEA: 0
BLOOD IN STOOL: 0
CONSTIPATION: 0
EYE ITCHING: 0
VOICE CHANGE: 0
ANAL BLEEDING: 0
FACIAL SWELLING: 0
DIARRHEA: 0
BACK PAIN: 0
EYE REDNESS: 0
APNEA: 0
CHOKING: 0
TROUBLE SWALLOWING: 0
SHORTNESS OF BREATH: 0
WHEEZING: 0
VOMITING: 0
EYE DISCHARGE: 0
COUGH: 0

## 2023-01-23 ASSESSMENT — PATIENT HEALTH QUESTIONNAIRE - PHQ9
5. POOR APPETITE OR OVEREATING: 0
SUM OF ALL RESPONSES TO PHQ9 QUESTIONS 1 & 2: 0
8. MOVING OR SPEAKING SO SLOWLY THAT OTHER PEOPLE COULD HAVE NOTICED. OR THE OPPOSITE, BEING SO FIGETY OR RESTLESS THAT YOU HAVE BEEN MOVING AROUND A LOT MORE THAN USUAL: 0
9. THOUGHTS THAT YOU WOULD BE BETTER OFF DEAD, OR OF HURTING YOURSELF: 0
SUM OF ALL RESPONSES TO PHQ QUESTIONS 1-9: 0
3. TROUBLE FALLING OR STAYING ASLEEP: 0
4. FEELING TIRED OR HAVING LITTLE ENERGY: 0
1. LITTLE INTEREST OR PLEASURE IN DOING THINGS: 0
2. FEELING DOWN, DEPRESSED OR HOPELESS: 0
SUM OF ALL RESPONSES TO PHQ QUESTIONS 1-9: 0
6. FEELING BAD ABOUT YOURSELF - OR THAT YOU ARE A FAILURE OR HAVE LET YOURSELF OR YOUR FAMILY DOWN: 0
7. TROUBLE CONCENTRATING ON THINGS, SUCH AS READING THE NEWSPAPER OR WATCHING TELEVISION: 0
10. IF YOU CHECKED OFF ANY PROBLEMS, HOW DIFFICULT HAVE THESE PROBLEMS MADE IT FOR YOU TO DO YOUR WORK, TAKE CARE OF THINGS AT HOME, OR GET ALONG WITH OTHER PEOPLE: 0

## 2023-01-23 NOTE — PROGRESS NOTES
Well Adult Note  Name: Fiorella Landing Date: 2023   MRN: [id number] Sex: Male   Age: 48 y.o. Ethnicity: Non- / Non    : 1970 Race: Black / African American      Chio Biswas is here for well adult exam.  History:  Patient presents for annual physical exam.    Patient has Type 2 diabetes. Patient is diet controlled. Patient states he has been monitoring his diet closer and losing some weight. Patient is trying to eliminate sugar and carbohydrates. Patient plays basketball 2-3 days per week and walks or bikes 1-2 days per week. Patient tests once daily primarily fasting and it averages 117. Patient has hyperlipidemia. Patient takes Simvastatin 40mg nightly. Patient decreases fat and cholesterol. Patient has allergic rhinitis. Patient states allergies are worse in spring and fall. Patient takes Flonase nasal spray as needed. Patient states he fell 6 months ago playing basketball and elbow is still sore. Patient states his arm is uncomfortable with shoveling snow yesterday. No swelling. Pain hasn't gone away. Certain physical activities may flare it up but can't think of anything in particular. Review of Systems   Constitutional:  Positive for unexpected weight change (weight loss with change in diet). Negative for activity change, appetite change, chills, diaphoresis, fatigue and fever. HENT:  Negative for congestion, ear discharge, ear pain, facial swelling, hearing loss, mouth sores, nosebleeds, postnasal drip, rhinorrhea, sinus pressure, sinus pain, sneezing, sore throat, tinnitus, trouble swallowing and voice change. Eyes:  Negative for photophobia, pain, discharge, redness, itching and visual disturbance. Respiratory:  Negative for apnea, cough, choking, chest tightness, shortness of breath and wheezing. Cardiovascular:  Negative for chest pain, palpitations and leg swelling.    Gastrointestinal:  Negative for abdominal distention, abdominal pain, anal bleeding, blood in stool, constipation, diarrhea, nausea, rectal pain and vomiting. Endocrine: Negative for cold intolerance and heat intolerance. Genitourinary:  Negative for decreased urine volume, difficulty urinating, dysuria, flank pain, frequency, genital sores, hematuria, penile discharge, penile pain, penile swelling, scrotal swelling, testicular pain and urgency. Musculoskeletal:  Positive for arthralgias. Negative for back pain, gait problem, joint swelling, myalgias, neck pain and neck stiffness. Skin:  Negative for rash and wound. Neurological:  Negative for dizziness, tremors, seizures, syncope, facial asymmetry, speech difficulty, weakness, light-headedness, numbness and headaches. Hematological:  Negative for adenopathy. Does not bruise/bleed easily. Psychiatric/Behavioral:  Negative for decreased concentration, dysphoric mood and sleep disturbance. The patient is not nervous/anxious. Allergies   Allergen Reactions    Seasonal Shortness Of Breath         Prior to Visit Medications    Medication Sig Taking?  Authorizing Provider   lisinopril (PRINIVIL;ZESTRIL) 5 MG tablet Take 1 tablet by mouth daily Yes Kandace Daigle MD   rosuvastatin (CRESTOR) 40 MG tablet Take 1 tablet by mouth daily Yes Kandace Daigle MD   aluminum chloride (HYPERCARE) 20 % external solution APPLY TOPICALLY TO THE AFFECTED AREA EVERY NIGHT AT BEDTIME Yes Kandace Daigle MD   Blood Glucose Monitoring Suppl (BLOOD GLUCOSE MONITOR SYSTEM) w/Device KIT Dispense glucometer covered by patient's insurance Yes Kandace Daigle MD   blood glucose monitor strips Test once daily Yes Kandace Daigle MD   Lancets MISC Use to test blood sugar once daily Yes Kandace Daigle MD   Cinnamon 500 MG CAPS Take 1,000 mg by mouth daily Yes Historical Provider, MD   ibuprofen (ADVIL;MOTRIN) 600 MG tablet Take 1 tablet by mouth 3 times daily as needed for Pain Yes Kandace Daigle MD   fluticasone (FLONASE) 50 MCG/ACT nasal spray 2 sprays by Nasal route daily Yes Kandace Daigle MD   MULTIPLE VITAMIN PO Take  by mouth. Yes Historical Provider, MD   FISH OIL Take 1,200 mg by mouth 2 times daily  Yes Historical Provider, MD         Past Medical History:   Diagnosis Date    Allergic rhinitis 10/03/2016    Hyperlipidemia     Influenza A 03/10/2016    Prediabetes 04/03/2017    Type 2 diabetes mellitus without complication [facility]) August 2022       Past Surgical History:   Procedure Laterality Date    HERNIA REPAIR  age 5    WISDOM TOOTH EXTRACTION  age 14         Family History   Problem Relation Age of Onset    Mult Sclerosis Mother 48    Other Mother         Multiple Sclerosis (MS)    Heart Disease Father         3V CABG age 66    High Cholesterol Father     Mult Sclerosis Brother 28    Other Brother         Multiple Sclerosis (MS)    Hypertension Neg Hx     Diabetes Neg Hx        Social History     Tobacco Use    Smoking status: Never    Smokeless tobacco: Never    Tobacco comments:     I have never smoked   Vaping Use    Vaping Use: Never used   Substance Use Topics    Alcohol use: Yes     Alcohol/week: 3.0 standard drinks     Types: 3 Cans of beer per week     Comment: I usually have no more than 2 - 3 drinks in a week. Drug use: No       Objective   /80   Pulse 86   Resp 16   Ht 6' (1.829 m)   Wt 190 lb (86.2 kg)   SpO2 98%   BMI 25.77 kg/m²   Wt Readings from Last 3 Encounters:   01/23/23 190 lb (86.2 kg)   08/10/22 195 lb (88.5 kg)   12/22/21 202 lb (91.6 kg)     Additional Measurements    01/23/23 0802   Waist (Inches): 34 in         Physical Exam  Nursing note reviewed. Constitutional:       General: He is not in acute distress. Appearance: Normal appearance. He is well-developed. HENT:      Head: Normocephalic and atraumatic.       Right Ear: Tympanic membrane, ear canal and external ear normal.      Left Ear: Tympanic membrane, ear canal and external ear normal.      Nose: Nose normal. Eyes:      General: Lids are normal.      Extraocular Movements: Extraocular movements intact. Conjunctiva/sclera: Conjunctivae normal.      Pupils: Pupils are equal, round, and reactive to light. Neck:      Thyroid: No thyromegaly. Vascular: No carotid bruit. Cardiovascular:      Rate and Rhythm: Normal rate and regular rhythm. Pulses: Normal pulses. Heart sounds: Normal heart sounds, S1 normal and S2 normal. No murmur heard. No friction rub. No gallop. Pulmonary:      Effort: Pulmonary effort is normal.      Breath sounds: Normal breath sounds. No wheezing, rhonchi or rales. Abdominal:      General: Bowel sounds are normal. There is no distension. Palpations: Abdomen is soft. Tenderness: There is no abdominal tenderness. Hernia: There is no hernia in the left inguinal area or right inguinal area. Genitourinary:     Penis: Normal.       Testes: Normal.      Prostate: Normal.      Rectum: Normal.      Comments: Chaperone for Intimate Exam  Chaperone was offered as part of the rooming process. Patient declined and agrees to continue with exam without a chaperone  Musculoskeletal:      Right shoulder: No tenderness. Normal range of motion. Left shoulder: No tenderness. Normal range of motion. Right elbow: No swelling. No tenderness. Left elbow: No swelling. No tenderness. Right wrist: No swelling or tenderness. Left wrist: No swelling or tenderness. Right hand: No swelling or tenderness. Left hand: No swelling or tenderness. Cervical back: Neck supple. No tenderness. Thoracic back: No tenderness. Lumbar back: No tenderness. Normal range of motion. Right hip: No tenderness. Left hip: No tenderness. Right knee: No tenderness. Left knee: No tenderness. Right lower leg: No edema. Left lower leg: No edema. Right ankle: No swelling. No tenderness. Left ankle: No swelling. No tenderness. Right foot: No swelling. Left foot: No swelling. Skin:     General: Skin is warm and dry. Comments: No foot ulcers     Neurological:      Mental Status: He is alert and oriented to person, place, and time. Deep Tendon Reflexes: Reflexes are normal and symmetric. Comments: Bilateral foot monofilament exam normal   Psychiatric:         Mood and Affect: Mood normal.         Speech: Speech normal.       Results for POC orders placed in visit on 01/23/23   POCT glycosylated hemoglobin (Hb A1C)   Result Value Ref Range    Hemoglobin A1C 6.2 %       Assessment   Plan   1. Encounter for well adult exam without abnormal findings  - Comprehensive Metabolic Panel; Future  - CBC with Auto Differential; Future  - Lipid Panel; Future  - TSH; Future  - Urinalysis; Future  -Colonoscopy done on 3/7/22  - Tdap today  - Flu shot done on 10/20/22  - Shingrix done on 8/10/22 and 1/23/23    2. Type 2 diabetes mellitus without complication, without long-term current use of insulin ([facility])  - Hemoglobin A1c of 6.2% shows diabetes is well controlled  -Continue diet control   -Limit carbohydrates to 45 grams with meals and 15 grams with snacks  -monitor blood sugars  -goal for blood sugar fasting or pre-meal  is   -goal for blood sugar 2 hours after a meal is less than 180  -goal for blood sugar at bedtime is less than 150  -Regular aerobic exercise  - POCT glycosylated hemoglobin (Hb A1C)  - Microalbumin / Creatinine Urine Ratio; Future    3. Mixed hyperlipidemia  - Last LDL is not at goal  - Simvastatin changed to Rosuvastatin last labs  - Continue rosuvastatin (CRESTOR) 40 MG tablet; Take 1 tablet by mouth daily  Dispense: 90 tablet; Refill: 1  -Continue same medications  -Low fat, low cholesterol diet  -Regular aerobic exercise  - Lipid Panel; Future    4. Allergic rhinitis, unspecified seasonality, unspecified trigger  -stable  -Continue Flonase nasal spray as needed    5.  Left elbow pain  -Ibuprofen as needed  - Referral to  MD Ashlyn, Hand Surgery (Hand, Wrist, Upper Extremity), [facility]    6. Microalbuminuria  - lisinopril (PRINIVIL;ZESTRIL) 5 MG tablet; Take 1 tablet by mouth daily  Dispense: 90 tablet; Refill: 1  - Microalbumin / Creatinine Urine Ratio; Future    7. Need for Tdap vaccination  - Tdap, BOOSTRIX, (age 8 yrs+), IM given    8. Need for shingles vaccine  - Zoster, SHINGRIX, (18 yrs +), IM given    9. Screening PSA (prostate specific antigen)  - PSA Screening; Future    10. Screening for ischemic heart disease  - EKG 12 lead    11.  Abnormal EKG  - Referral to  Jarett Bell MD, Cardiology, [facility]      Personalized Preventive Plan   Current Health Maintenance Status  Immunization History   Administered Date(s) Administered    COVID-19, PFIZER Bivalent BOOSTER, DO NOT Dilute, (age 12y+), IM, 30 mcg/0.3 mL 10/28/2022    COVID-19, PFIZER PURPLE top, DILUTE for use, (age 15 y+), 30mcg/0.3mL 03/23/2021, 04/20/2021, 12/22/2021, 05/20/2022    INFLUENZA, INTRADERMAL, QUADRIVALENT, PRESERVATIVE FREE 10/03/2016, 10/19/2017    Influenza 10/15/2012, 11/08/2013    Influenza Virus Vaccine 10/06/2011, 10/22/2014    Influenza, FLUARIX, FLULAVAL, Mildred Deutscher (age 10 mo+) AND AFLURIA, (age 1 y+), PF, 0.5mL 09/19/2018, 09/24/2019, 09/21/2020    Influenza, FLUCELVAX, (age 10 mo+), MDCK, PF, 0.5mL 09/28/2021, 10/20/2022    Influenza, Intradermal, Preservative free 09/29/2015    Tdap (Boostrix, Adacel) 10/15/2012, 01/23/2023    Zoster Recombinant (Shingrix) 08/10/2022, 01/23/2023        Health Maintenance   Topic Date Due    Pneumococcal 0-64 years Vaccine (1 - PCV) Never done    Hepatitis B vaccine (1 of 3 - Risk 3-dose series) Never done    Diabetic foot exam  08/10/2023    Diabetic retinal exam  09/09/2023    A1C test (Diabetic or Prediabetic)  01/23/2024    Diabetic Alb to Cr ratio (uACR) test  01/23/2024    Lipids  01/23/2024    Depression Screen  01/23/2024    GFR test (Diabetes, CKD 3-4, OR last GFR 15-59)  01/23/2024    Colorectal Cancer Screen  03/07/2029    DTaP/Tdap/Td vaccine (3 - Td or Tdap) 01/23/2033    Flu vaccine  Completed    Shingles vaccine  Completed    COVID-19 Vaccine  Completed    Hepatitis C screen  Completed    HIV screen  Completed    Hepatitis A vaccine  Aged Out    Hib vaccine  Aged Out    Meningococcal (ACWY) vaccine  Aged Out     Recommendations for [facility] Due: see orders and patient instructions/AVS.    Return in about 6 months (around 7/23/2023) for diabetes, hyperlipidemia, and allergic rhinitis.

## 2023-01-23 NOTE — PATIENT INSTRUCTIONS
Well Visit, Ages 25 to 72: Care Instructions  Well visits can help you stay healthy. Your doctor has checked your overall health and may have suggested ways to take good care of yourself. Your doctor also may have recommended tests. You can help prevent illness with healthy eating, good sleep, vaccinations, regular exercise, and other steps. Get the tests that you and your doctor decide on. Depending on your age and risks, examples might include screening for diabetes; hepatitis C; HIV; and cervical, breast, lung, and colon cancer. Screening helps find diseases before any symptoms appear. Eat healthy foods. Choose fruits, vegetables, whole grains, lean protein, and low-fat dairy foods. Limit saturated fat and reduce salt. Limit alcohol. Men should have no more than 2 drinks a day. Women should have no more than 1. For some people, no alcohol is the best choice. Exercise. Get at least 30 minutes of exercise on most days of the week. Walking can be a good choice. Reach and stay at your healthy weight. This will lower your risk for many health problems. Take care of your mental health. Try to stay connected with friends, family, and community, and find ways to manage stress. If you're feeling depressed or hopeless, talk to someone. A counselor can help. If you don't have a counselor, talk to your doctor. Talk to your doctor if you think you may have a problem with alcohol or drug use. This includes prescription medicines and illegal drugs. Avoid tobacco and nicotine: Don't smoke, vape, or chew. If you need help quitting, talk to your doctor. Practice safer sex. Getting tested, using condoms or dental dams, and limiting sex partners can help prevent STIs. Use birth control if it's important to you to prevent pregnancy. Talk with your doctor about your choices and what might be best for you. Prevent problems where you can.  Protect your skin from too much sun, wash your hands, brush your teeth twice a day, and wear a seat belt in the car. Where can you learn more? Go to http://www.maradiaga.com/ and enter P072 to learn more about \"Well Visit, Ages 25 to 72: Care Instructions. \"  Current as of: March 9, 2022               Content Version: 13.5  © 3364-4002 [employer]. Care instructions adapted under license by [facility] ([facility]). If you have questions about a medical condition or this instruction, always ask your healthcare professional. [address] any warranty or liability for your use of this information.

## 2023-02-01 ENCOUNTER — OFFICE VISIT (OUTPATIENT)
Dept: ORTHOPEDIC SURGERY | Age: 53
End: 2023-02-01

## 2023-02-01 VITALS — WEIGHT: 190 LBS | HEIGHT: 72 IN | BODY MASS INDEX: 25.73 KG/M2

## 2023-02-01 DIAGNOSIS — M25.522 LEFT ELBOW PAIN: Primary | ICD-10-CM

## 2023-02-01 DIAGNOSIS — M77.12 LATERAL EPICONDYLITIS OF LEFT ELBOW: ICD-10-CM

## 2023-02-01 SDOH — HEALTH STABILITY: PHYSICAL HEALTH: ON AVERAGE, HOW MANY DAYS PER WEEK DO YOU ENGAGE IN MODERATE TO STRENUOUS EXERCISE (LIKE A BRISK WALK)?: 3 DAYS

## 2023-02-01 SDOH — HEALTH STABILITY: PHYSICAL HEALTH: ON AVERAGE, HOW MANY MINUTES DO YOU ENGAGE IN EXERCISE AT THIS LEVEL?: 60 MIN

## 2023-02-01 ASSESSMENT — SOCIAL DETERMINANTS OF HEALTH (SDOH)
WITHIN THE LAST YEAR, HAVE YOU BEEN KICKED, HIT, SLAPPED, OR OTHERWISE PHYSICALLY HURT BY YOUR PARTNER OR EX-PARTNER?: NO
WITHIN THE LAST YEAR, HAVE YOU BEEN AFRAID OF YOUR PARTNER OR EX-PARTNER?: NO
WITHIN THE LAST YEAR, HAVE YOU BEEN HUMILIATED OR EMOTIONALLY ABUSED IN OTHER WAYS BY YOUR PARTNER OR EX-PARTNER?: NO
WITHIN THE LAST YEAR, HAVE TO BEEN RAPED OR FORCED TO HAVE ANY KIND OF SEXUAL ACTIVITY BY YOUR PARTNER OR EX-PARTNER?: NO

## 2023-02-01 NOTE — PROGRESS NOTES
Date:  2023    Name:  Guzman Daigle  Address:  [address] [place] [address]    :  1970      Age:   48 y.o.    SSN:  [id number]      Medical Record Number:  [id number]    Reason for Visit:    Chief Complaint    Pain (Left elbow)      DOS:2023     HPI: Jeferson Brumfield is a 48 y.o. male RHD here today for left elbow pain. Pain began after a fall onto his elbow several weeks ago when playing basketball. Initially did not have much pain however it started to worsen with changes in weather as well as lifting or gripping heavy objects. The pain is located over the lateral aspect of the elbow. He denies any mechanical symptoms. He has no numbness or tingling in the hand. ROS: Review of systems reviewed from Patient History Form completed today and available in the patient's chart under the Media tab. Past Medical History:   Diagnosis Date    Allergic rhinitis 10/03/2016    Hyperlipidemia     Influenza A 03/10/2016    Prediabetes 2017    Type 2 diabetes mellitus without complication [facility]) 2022        Past Surgical History:   Procedure Laterality Date    HERNIA REPAIR  age 5    WISDOM TOOTH EXTRACTION  age 14       Family History   Problem Relation Age of Onset    Mult Sclerosis Mother 48    Other Mother         Multiple Sclerosis (MS)    Heart Disease Father         3V CABG age 66    High Cholesterol Father     Mult Sclerosis Brother 28    Other Brother         Multiple Sclerosis (MS)    Hypertension Neg Hx     Diabetes Neg Hx        Social History     Socioeconomic History    Marital status:      Spouse name: None    Number of children: None    Years of education: None    Highest education level: None   Tobacco Use    Smoking status: Never    Smokeless tobacco: Never    Tobacco comments:     I have never smoked   Vaping Use    Vaping Use: Never used   Substance and Sexual Activity    Alcohol use:  Yes     Alcohol/week: 3.0 standard drinks     Types: 3 Cans of beer per week     Comment: I usually have no more than 2 - 3 drinks in a week. Drug use: No    Sexual activity: Yes     Partners: Female     Comment: Only with spouse     Social Determinants of Health     Financial Resource Strain: Low Risk     Difficulty of Paying Living Expenses: Not hard at all   Food Insecurity: No Food Insecurity    Worried About [address] in the Last Year: Never true    [address] in the Last Year: Never true   Transportation Needs: No Transportation Needs    Lack of Transportation (Medical): No    Lack of Transportation (Non-Medical): No   Physical Activity: Sufficiently Active    Days of Exercise per Week: 3 days    Minutes of Exercise per Session: 60 min   Stress: No Stress Concern Present    Feeling of Stress : Not at all   Social Connections: Moderately Isolated    Frequency of Communication with Friends and Family: Three times a week    Frequency of Social Gatherings with Friends and Family:  Three times a week    Attends Congregation Services: Never    Active Member of Clubs or Organizations: No    Attends Club or Organization Meetings: Never    Marital Status:    Intimate Partner Violence: Not At Risk    Fear of Current or Ex-Partner: No    Emotionally Abused: No    Physically Abused: No    Sexually Abused: No   Housing Stability: Low Risk     Unable to Pay for Housing in the Last Year: No    Number of [place] in the Last Year: 1    Unstable Housing in the Last Year: No       Current Outpatient Medications   Medication Sig Dispense Refill    lisinopril (PRINIVIL;ZESTRIL) 5 MG tablet Take 1 tablet by mouth daily 90 tablet 1    rosuvastatin (CRESTOR) 40 MG tablet Take 1 tablet by mouth daily 90 tablet 1    aluminum chloride (HYPERCARE) 20 % external solution APPLY TOPICALLY TO THE AFFECTED AREA EVERY NIGHT AT BEDTIME 105 mL 1    Blood Glucose Monitoring Suppl (BLOOD GLUCOSE MONITOR SYSTEM) w/Device KIT Dispense glucometer covered by patient's insurance 1 kit 0 blood glucose monitor strips Test once daily 100 strip 3    Lancets MISC Use to test blood sugar once daily 100 each 3    Cinnamon 500 MG CAPS Take 1,000 mg by mouth daily      ibuprofen (ADVIL;MOTRIN) 600 MG tablet Take 1 tablet by mouth 3 times daily as needed for Pain 30 tablet 0    fluticasone (FLONASE) 50 MCG/ACT nasal spray 2 sprays by Nasal route daily      MULTIPLE VITAMIN PO Take  by mouth. FISH OIL Take 1,200 mg by mouth 2 times daily        No current facility-administered medications for this visit. Allergies   Allergen Reactions    Seasonal Shortness Of Breath       Vital signs:  Ht 6' (1.829 m)   Wt 190 lb (86.2 kg)   BMI 25.77 kg/m²        Left Elbow Examination:    Inspection:    Normal alignment. No swelling. Palpation:    Mild tenderness palpation over the lateral epicondyle. Prominence over the posterior olecranon. Range of Motion:      0-135 degrees. Strength:       5/5 in all muscle groups    Instability testing:  Stable to varus and valgus stress    Vascular exam:    Skin warm and well-perfused. Neurologic exam:     Sensation intact to light    Special tests: Positive pain with resistive wrist extension and middle finger extension    Right Elbow Comparison Examination:    Inspection:    Normal alignment. No swelling. Palpation:     No tenderness to palpation    Range of Motion:      0-135 degrees. Strength:       5/5 in all muscle groups    Instability testing:  Stable to varus and valgus stress    Vascular exam:    Skin warm and well-perfused. Neurologic exam:     Sensation intact to light      Diagnostics:  Radiology:     Pertinent imaging was obtained, interpreted, and reviewed with the patient today, images only - no report available.       X-rays of the left elbow including AP, lateral and oblique views were obtained and reviewed in office: Which demonstrate enthesopathy of the triceps insertion, maintained joint spaces, no fractures, dislocations, tumors/masses. Impression: Left distal triceps enthesopathy, otherwise normal    Office Procedures:  Orders Placed This Encounter   Procedures    Breg Tennis Elbow Strap $20     Patient was supplied a Breg Tennis Elbow Strap. This retail item was supplied to provide functional support and assist in protecting the affected area. Verbal and written instructions for the use of and application of this item were provided. The patient was educated and fit by a healthcare professional with expert knowledge and specialization in brace application. They were instructed to contact the office immediately should the equipment result in increased pain, decreased sensation, increased swelling or worsening of the condition. XR ELBOW LEFT (MIN 3 VIEWS)     Standing Status:   Future     Number of Occurrences:   1     Standing Expiration Date:   2/1/2024       Assessment: 68-year-old right-hand-dominant male with left elbow lateral epicondylitis. Plan: Pertinent imaging was reviewed. The etiology, natural history, and treatment options for the disorder were discussed. The roles of activity medication, antiinflammatories, injections, bracing, physical therapy, and surgical interventions were all described to the patient and questions were answered. Geena Mason comes in with several weeks of left elbow pain located over the lateral epicondyle and worse with changes in weather and heavy gripping. His symptoms are very minimal at this point we recommend conservative therapy with at home physical therapy exercises and a counterforce brace as well as oral anti-inflammatories. He may follow-up as needed, but his pain worsened we discussed cortisone injection. Laurie Alexander is in agreement with this plan. All questions were answered to patient's satisfaction and was encouraged to call with any further questions.      Total time spent for evaluation, education, and development of treatment plan: 55 minutes    The encounter with the patient was supervised by Dr. Carlos Enrique Perry who personally examined the patient and reviewed the plan. Kranthi Cortez MD  [facility] Sports Medicine Fellow      This dictation was performed with a verbal recognition program [facility]) and it was checked for errors. It is possible that there are still dictated errors within this office note. If so, please bring any areas to my attention for an addendum. All efforts were made to ensure that this office note is accurate. I attest that I met personally with the patient, performed the described exam, reviewed the radiographic studies and medical records associated with this patient and supervised the services that are described above.      Niranjan Balbuena MD

## 2023-02-02 ENCOUNTER — E-VISIT (OUTPATIENT)
Dept: PRIMARY CARE CLINIC | Age: 53
End: 2023-02-02
Payer: COMMERCIAL

## 2023-02-02 DIAGNOSIS — H10.9 CONJUNCTIVITIS, UNSPECIFIED CONJUNCTIVITIS TYPE, UNSPECIFIED LATERALITY: Primary | ICD-10-CM

## 2023-02-02 PROCEDURE — 99421 OL DIG E/M SVC 5-10 MIN: CPT | Performed by: INTERNAL MEDICINE

## 2023-02-02 RX ORDER — POLYMYXIN B SULFATE AND TRIMETHOPRIM 1; 10000 MG/ML; [USP'U]/ML
1 SOLUTION OPHTHALMIC EVERY 4 HOURS
Qty: 10 ML | Refills: 0 | Status: SHIPPED | OUTPATIENT
Start: 2023-02-02 | End: 2023-02-09

## 2023-02-02 NOTE — PROGRESS NOTES
Jessica Marr (1970) initiated an asynchronous digital communication through [address] [place] [address]. HPI: per patient questionnaire     Exam: not applicable    Diagnoses and all orders for this visit:    Conjunctivitis, unspecified conjunctivitis type, unspecified laterality  -     trimethoprim-polymyxin b (POLYTRIM) 20305-9.1 UNIT/ML-% ophthalmic solution; Place 1 drop into the left eye every 4 hours for 7 days  -     sent patient a MyChart reply to inform him        Time: EV1 - 5-10 minutes were spent on the digital evaluation and management of this patient.     Júnior Carrasco MD

## 2023-02-11 PROBLEM — R80.9 MICROALBUMINURIA: Status: ACTIVE | Noted: 2023-02-11

## 2023-02-12 PROBLEM — M25.522 LEFT ELBOW PAIN: Status: ACTIVE | Noted: 2023-02-12

## 2023-02-21 NOTE — PROGRESS NOTES
[facility]     Outpatient Cardiology         Patient Name:  Taylor Fu  Requesting Physician: No admitting provider for patient encounter. Primary Care Physician: Kandace Daigle MD    Reason for Consultation/Chief Complaint:   Chief Complaint   Patient presents with    Results     Referred by PCP for abn EKG     History of Present Illness:    HPI     Landy Pappas a 48 y.o. male with PMH of HLD, DM II. Here for abnormal EKG. Referred by PCP. Hypertension, well-controlled current medications. Hyperlipidemia, doing well with Crestor. Abnormal EKG, personally reviewed, I question the computer read, more than likely this is a normal EKG. Today we discussed getting a CT calcium score for screening purposes given his risk factors. I do not think he will need a stress test at this point. If his calcium score is elevated we will aim for goal LDL of 55. PMH  Past Medical History:   Diagnosis Date    Allergic rhinitis 10/03/2016    Hyperlipidemia     Influenza A 03/10/2016    Prediabetes 04/03/2017    Type 2 diabetes mellitus without complication [facility]) August 2022       [facility]  Past Surgical History:   Procedure Laterality Date    HERNIA REPAIR  age 5    WISDOM TOOTH EXTRACTION  age 14        Social HIstory  Social History     Tobacco Use    Smoking status: Never    Smokeless tobacco: Never    Tobacco comments:     I have never smoked   Vaping Use    Vaping Use: Never used   Substance Use Topics    Alcohol use: Yes     Alcohol/week: 3.0 standard drinks     Types: 3 Cans of beer per week     Comment: I usually have no more than 2 - 3 drinks in a week.     Drug use: No       Family History  Family History   Problem Relation Age of Onset    Mult Sclerosis Mother 48    Other Mother         Multiple Sclerosis (MS)    Heart Disease Father         3V CABG age 66    High Cholesterol Father     Mult Sclerosis Brother 28    Other Brother         Multiple Sclerosis (MS)    Hypertension Neg Hx Diabetes Neg Hx        Allergies   Allergies   Allergen Reactions    Seasonal Shortness Of Breath       Medications:     Home Medications:  Were reviewed and are listed in nursing record. and/or listed below    Prior to Admission medications    Medication Sig Start Date End Date Taking? Authorizing Provider   lisinopril (PRINIVIL;ZESTRIL) 5 MG tablet Take 1 tablet by mouth daily 1/23/23  Yes Silvana Browning MD   rosuvastatin (CRESTOR) 40 MG tablet Take 1 tablet by mouth daily 1/23/23  Yes Silvana Browning MD   aluminum chloride (HYPERCARE) 20 % external solution APPLY TOPICALLY TO THE AFFECTED AREA EVERY NIGHT AT BEDTIME 8/10/22  Yes Silvana Browning MD   Blood Glucose Monitoring Suppl (BLOOD GLUCOSE MONITOR SYSTEM) w/Device KIT Dispense glucometer covered by patient's insurance 8/10/22  Yes Silvana Browning MD   blood glucose monitor strips Test once daily 8/10/22  Yes Silvana Browning MD   Lancets MISC Use to test blood sugar once daily 8/10/22  Yes Silvana Browning MD   Cinnamon 500 MG CAPS Take 1,000 mg by mouth daily   Yes Historical Provider, MD   ibuprofen (ADVIL;MOTRIN) 600 MG tablet Take 1 tablet by mouth 3 times daily as needed for Pain 4/22/19  Yes Silvana Browning MD   fluticasone [facility]) 50 MCG/ACT nasal spray 2 sprays by Nasal route daily 10/3/16  Yes Silvana Browning MD   MULTIPLE VITAMIN PO Take  by mouth. Yes Historical Provider, MD   FISH OIL Take 1,200 mg by mouth 2 times daily    Yes Historical Provider, MD        Review of Systems   Constitutional:  Negative for activity change, appetite change, diaphoresis, fatigue, fever and unexpected weight change. HENT:  Negative for congestion, facial swelling, mouth sores and nosebleeds. Eyes:  Negative for discharge and visual disturbance. Respiratory:  Negative for cough, chest tightness, shortness of breath and wheezing. Cardiovascular:  Negative for chest pain, palpitations and leg swelling.    Gastrointestinal: Negative for abdominal distention, abdominal pain, blood in stool and vomiting. Endocrine: Negative for cold intolerance, heat intolerance and polyuria. Genitourinary:  Negative for difficulty urinating, dysuria, frequency and hematuria. Musculoskeletal:  Negative for back pain, joint swelling, myalgias and neck pain. Skin:  Negative for color change, pallor and rash. Allergic/Immunologic: Negative for immunocompromised state. Neurological:  Negative for dizziness, syncope, weakness, light-headedness, numbness and headaches. Hematological:  Negative for adenopathy. Does not bruise/bleed easily. Psychiatric/Behavioral:  Negative for behavioral problems, confusion, decreased concentration and suicidal ideas. The patient is not nervous/anxious. Vitals:    02/28/23 1203   BP: 120/72   Pulse: 68   SpO2: 98%    Weight: 195 lb (88.5 kg)       Vitals:    02/28/23 1203   BP: 120/72   Site: Left Upper Arm   Position: Sitting   Cuff Size: Medium Adult   Pulse: 68   SpO2: 98%   Weight: 195 lb (88.5 kg)   Height: 6' (1.829 m)       BP Readings from Last 3 Encounters:   02/28/23 120/72   01/23/23 110/80   08/10/22 120/80       Wt Readings from Last 3 Encounters:   02/28/23 195 lb (88.5 kg)   02/01/23 190 lb (86.2 kg)   01/23/23 190 lb (86.2 kg)       Physical Exam  Constitutional:       General: He is not in acute distress. Appearance: He is well-developed. He is not diaphoretic. HENT:      Head: Normocephalic and atraumatic. Eyes:      Pupils: Pupils are equal, round, and reactive to light. Neck:      Thyroid: No thyromegaly. Vascular: No JVD. Cardiovascular:      Rate and Rhythm: Normal rate and regular rhythm. Chest Wall: PMI is not displaced. Heart sounds: Normal heart sounds, S1 normal and S2 normal. No murmur heard. No friction rub. No gallop. Pulmonary:      Effort: Pulmonary effort is normal. No respiratory distress. Breath sounds: Normal breath sounds. No stridor. No wheezing or rales. Chest:      Chest wall: No tenderness. Abdominal:      General: Bowel sounds are normal. There is no distension. Palpations: Abdomen is soft. Tenderness: There is no abdominal tenderness. There is no guarding or rebound. Musculoskeletal:         General: No tenderness. Normal range of motion. Cervical back: Normal range of motion. Lymphadenopathy:      Cervical: No cervical adenopathy. Skin:     General: Skin is warm and dry. Findings: No erythema or rash. Neurological:      Mental Status: He is alert and oriented to person, place, and time. Coordination: Coordination normal.   Psychiatric:         Behavior: Behavior normal.         Thought Content:  Thought content normal.         Judgment: Judgment normal.       Labs:       Lab Results   Component Value Date    WBC 4.9 01/23/2023    HGB 13.9 01/23/2023    HCT 43.3 01/23/2023    MCV 86.8 01/23/2023     01/23/2023     Lab Results   Component Value Date     01/23/2023    K 4.0 01/23/2023     01/23/2023    CO2 30 01/23/2023    BUN 16 01/23/2023    CREATININE 1.0 01/23/2023    GLUCOSE 116 (H) 01/23/2023    CALCIUM 9.8 01/23/2023    PROT 6.8 01/23/2023    LABALBU 4.4 01/23/2023    BILITOT 0.8 01/23/2023    ALKPHOS 67 01/23/2023    AST 25 01/23/2023    ALT 23 01/23/2023    LABGLOM >60 01/23/2023    GFRAA >60 08/10/2022    AGRATIO 1.8 01/23/2023    GLOB 2.6 05/25/2021         Lab Results   Component Value Date    CHOL 173 01/23/2023    CHOL 229 (H) 08/10/2022    CHOL 192 12/22/2021     Lab Results   Component Value Date    TRIG 52 01/23/2023    TRIG 95 08/10/2022    TRIG 57 12/22/2021     Lab Results   Component Value Date    HDL 64 (H) 01/23/2023    HDL 61 (H) 08/10/2022    HDL 63 (H) 12/22/2021     Lab Results   Component Value Date    LDLCALC 99 01/23/2023    LDLCALC 149 (H) 08/10/2022    LDLCALC 118 (H) 12/22/2021     Lab Results   Component Value Date    LABVLDL 10 01/23/2023    LABVLDL 19 08/10/2022    LABVLDL 11 2021     No results found for: CHOLHDLRATIO    No results found for: INR, PROTIME    The 10-year ASCVD risk score (Deonte ROBERTSON, et al., 2019) is: 8.3%    Values used to calculate the score:      Age: 48 years      Sex: Male      Is Non- : Yes      Diabetic: Yes      Tobacco smoker: No      Systolic Blood Pressure: 470 mmHg      Is BP treated: No      HDL Cholesterol: 64 mg/dL      Total Cholesterol: 173 mg/dL      Imaging:       Last ECG (if available, Personally interpreted):  Normal sinus rhythm, no ischemic changes  Last Monitor/Holter (if available):    Last Stress (if available): 10/6/09  CONCLUSION OF IMAGIN. Myocardial perfusion imaging study negative for definite   reversible perfusion defects, suggestive of ischemia. Mild   attenuation inferior wall likely secondary to increased GI uptake   and mild fixed defect in the apex likely secondary to apical   thinning artifact. 2. Normal LV size and systolic function. 3. Excellent exercise tolerance with no reproducible chest pain. 4. Clinical correlation is advised. Last Cath (if available):    Last TTE/ADRYAN(if available):    Last CMR  (if available):    Last Coronary Artery Calcium Score: Ankle-brachial index:    Carotid ultrasound screening:    Abdominal aortic aneurysm screening:       Assessment / Plan:     Mixed hyperlipidemia  Continue Crestor 40 mg, no side effects. Plan for CT calcium score, depending on score may need to add Zetia or Leqvio    Primary hypertension  Well-controlled on current dose of lisinopril    Abnormal ECG  Personally reviewed, no ischemic changes. Plan for CT calcium score for screening purposes    Follow up in 1 months. I had the opportunity to review the clinical symptoms and presentation of Michael Draper. Patient's allergies and medications were reviewed and updated.  Patient's past medical, surgical, social and family history were reviewed and updated. Patient's testing including laboratory, ECGs, monitor, imaging (TTE,FUENTES,CMR,cath) were reviewed. Tobacco use was discussed with the patient and educated on the negative effects. I have asked the patient to not utilize these agents. All questions and concerns were addressed to the patient/family. Alternatives to my treatment were discussed. The note was completed using EMR. Every effort wasmade to ensure accuracy; however, inadvertent computerized transcription errors may be present. Thank you for allowing me to participate in thecare or [facility].  Slade Enriquez MD, [facility] [address]

## 2023-02-28 ENCOUNTER — OFFICE VISIT (OUTPATIENT)
Dept: CARDIOLOGY CLINIC | Age: 53
End: 2023-02-28
Payer: COMMERCIAL

## 2023-02-28 VITALS
SYSTOLIC BLOOD PRESSURE: 120 MMHG | WEIGHT: 195 LBS | DIASTOLIC BLOOD PRESSURE: 72 MMHG | OXYGEN SATURATION: 98 % | HEIGHT: 72 IN | HEART RATE: 68 BPM | BODY MASS INDEX: 26.41 KG/M2

## 2023-02-28 DIAGNOSIS — E78.2 MIXED HYPERLIPIDEMIA: ICD-10-CM

## 2023-02-28 DIAGNOSIS — I10 PRIMARY HYPERTENSION: ICD-10-CM

## 2023-02-28 DIAGNOSIS — R94.31 ABNORMAL ECG: Primary | ICD-10-CM

## 2023-02-28 PROCEDURE — 3074F SYST BP LT 130 MM HG: CPT | Performed by: INTERNAL MEDICINE

## 2023-02-28 PROCEDURE — 3078F DIAST BP <80 MM HG: CPT | Performed by: INTERNAL MEDICINE

## 2023-02-28 PROCEDURE — 99204 OFFICE O/P NEW MOD 45 MIN: CPT | Performed by: INTERNAL MEDICINE

## 2023-02-28 ASSESSMENT — ENCOUNTER SYMPTOMS
ABDOMINAL PAIN: 0
ABDOMINAL DISTENTION: 0
VOMITING: 0
BACK PAIN: 0
CHEST TIGHTNESS: 0
SHORTNESS OF BREATH: 0
EYE DISCHARGE: 0
FACIAL SWELLING: 0
BLOOD IN STOOL: 0
COUGH: 0
COLOR CHANGE: 0
WHEEZING: 0

## 2023-02-28 NOTE — ASSESSMENT & PLAN NOTE
Continue Crestor 40 mg, no side effects.   Plan for CT calcium score, depending on score may need to add George or Reyna Burden

## 2023-04-07 ENCOUNTER — HOSPITAL ENCOUNTER (OUTPATIENT)
Dept: CT IMAGING | Age: 53
Discharge: HOME | End: 2023-04-07
Payer: COMMERCIAL

## 2023-04-07 DIAGNOSIS — R94.31 ABNORMAL ECG: ICD-10-CM

## 2023-04-07 DIAGNOSIS — I10 PRIMARY HYPERTENSION: ICD-10-CM

## 2023-04-07 DIAGNOSIS — E78.2 MIXED HYPERLIPIDEMIA: ICD-10-CM

## 2023-04-07 PROCEDURE — 75571 CT HRT W/O DYE W/CA TEST: CPT

## 2023-05-22 ENCOUNTER — OFFICE VISIT (OUTPATIENT)
Dept: PRIMARY CARE CLINIC | Age: 53
End: 2023-05-22
Payer: COMMERCIAL

## 2023-05-22 VITALS
OXYGEN SATURATION: 98 % | SYSTOLIC BLOOD PRESSURE: 136 MMHG | DIASTOLIC BLOOD PRESSURE: 82 MMHG | HEART RATE: 86 BPM | BODY MASS INDEX: 26.72 KG/M2 | WEIGHT: 197 LBS

## 2023-05-22 DIAGNOSIS — R09.81 NASAL CONGESTION: ICD-10-CM

## 2023-05-22 DIAGNOSIS — Z23 NEED FOR PNEUMOCOCCAL VACCINATION: ICD-10-CM

## 2023-05-22 DIAGNOSIS — E11.9 TYPE 2 DIABETES MELLITUS WITHOUT COMPLICATION, WITHOUT LONG-TERM CURRENT USE OF INSULIN (HCC): Primary | ICD-10-CM

## 2023-05-22 DIAGNOSIS — E78.2 MIXED HYPERLIPIDEMIA: ICD-10-CM

## 2023-05-22 DIAGNOSIS — H93.19 TINNITUS, UNSPECIFIED LATERALITY: Primary | ICD-10-CM

## 2023-05-22 PROCEDURE — 90677 PCV20 VACCINE IM: CPT | Performed by: INTERNAL MEDICINE

## 2023-05-22 PROCEDURE — 90471 IMMUNIZATION ADMIN: CPT | Performed by: INTERNAL MEDICINE

## 2023-05-22 PROCEDURE — 3079F DIAST BP 80-89 MM HG: CPT | Performed by: INTERNAL MEDICINE

## 2023-05-22 PROCEDURE — 3075F SYST BP GE 130 - 139MM HG: CPT | Performed by: INTERNAL MEDICINE

## 2023-05-22 PROCEDURE — 99213 OFFICE O/P EST LOW 20 MIN: CPT | Performed by: INTERNAL MEDICINE

## 2023-05-22 ASSESSMENT — ENCOUNTER SYMPTOMS
RHINORRHEA: 0
SINUS PAIN: 0
CHEST TIGHTNESS: 0
ABDOMINAL PAIN: 0
COUGH: 0
SHORTNESS OF BREATH: 0
WHEEZING: 0
DIARRHEA: 0
SORE THROAT: 0
SINUS PRESSURE: 0
VOMITING: 0
NAUSEA: 0

## 2023-05-22 NOTE — PROGRESS NOTES
Date of Visit: 2023    Fernie Braun (:  1970) is a 48 y.o. male,  Established patient here for evaluation of the following chief complaint(s):  Tinnitus (Bilateral )      ASSESSMENT/PLAN:    1. Tinnitus, unspecified laterality  -Stop Ibuprofen  -avoid aspirin  -Referral to [facility], Mely Espinoza DO, Otolaryngology, [facility]    2. Nasal congestion  -Flonase nasal spray 2 sprays each nostril once daily   -Mucinex D as needed    3. Need for pneumococcal vaccination  - Pneumococcal, PCV20, PREVNAR 21, (age 25 yrs+), IM, PF given          Return if symptoms worsen or fail to improve. SUBJECTIVE:    Ringing in ears. Cant tell which one started 2 days ago. No ear pain, loss of hearing, or clogged ears. Patient takes Ibuprofen every blue moon but has taken the past 3-4 days for muscle soreness from playing basketball. Patient doesn't take aspirin. Patient states he uses ear buds an hour per day. Patient states he is starting with nasal congestion today. Patient denies runny nose, post nasal drainage, sneezing, sore throat, cough, and fever. Patient states uses Flonase but hasn't taken it today. Patient plans to take Flonase and Mucinex D. Review of Systems   Constitutional:  Negative for appetite change, chills, fatigue and fever. HENT:  Positive for tinnitus. Negative for congestion, ear pain, postnasal drip, rhinorrhea, sinus pressure, sinus pain, sneezing and sore throat. Respiratory:  Negative for cough, chest tightness, shortness of breath and wheezing. Gastrointestinal:  Negative for abdominal pain, diarrhea, nausea and vomiting. Musculoskeletal:  Negative for myalgias. Neurological:  Negative for headaches.      Allergies   Allergen Reactions    Seasonal Shortness Of Breath       Outpatient Medications Marked as Taking for the 23 encounter (Office Visit) with Cherie Berumen MD   Medication Sig Dispense Refill    lisinopril (PRINIVIL;ZESTRIL) 5 MG tablet Take 1

## 2023-06-20 ENCOUNTER — PROCEDURE VISIT (OUTPATIENT)
Dept: AUDIOLOGY | Age: 53
End: 2023-06-20

## 2023-06-20 ENCOUNTER — OFFICE VISIT (OUTPATIENT)
Dept: ENT CLINIC | Age: 53
End: 2023-06-20
Payer: COMMERCIAL

## 2023-06-20 VITALS
BODY MASS INDEX: 27.09 KG/M2 | DIASTOLIC BLOOD PRESSURE: 82 MMHG | HEART RATE: 78 BPM | WEIGHT: 200 LBS | TEMPERATURE: 97.7 F | HEIGHT: 72 IN | SYSTOLIC BLOOD PRESSURE: 131 MMHG | OXYGEN SATURATION: 97 %

## 2023-06-20 DIAGNOSIS — J30.9 ALLERGIC RHINITIS, UNSPECIFIED SEASONALITY, UNSPECIFIED TRIGGER: ICD-10-CM

## 2023-06-20 DIAGNOSIS — H93.13 TINNITUS OF BOTH EARS: Primary | ICD-10-CM

## 2023-06-20 DIAGNOSIS — J35.8 TONSIL STONE: ICD-10-CM

## 2023-06-20 DIAGNOSIS — H90.3 SENSORINEURAL HEARING LOSS, BILATERAL: Primary | ICD-10-CM

## 2023-06-20 DIAGNOSIS — H93.13 TINNITUS OF BOTH EARS: ICD-10-CM

## 2023-06-20 DIAGNOSIS — H90.3 SENSORINEURAL HEARING LOSS (SNHL) OF BOTH EARS: ICD-10-CM

## 2023-06-20 PROCEDURE — 3079F DIAST BP 80-89 MM HG: CPT | Performed by: STUDENT IN AN ORGANIZED HEALTH CARE EDUCATION/TRAINING PROGRAM

## 2023-06-20 PROCEDURE — 3075F SYST BP GE 130 - 139MM HG: CPT | Performed by: STUDENT IN AN ORGANIZED HEALTH CARE EDUCATION/TRAINING PROGRAM

## 2023-06-20 PROCEDURE — 99203 OFFICE O/P NEW LOW 30 MIN: CPT | Performed by: STUDENT IN AN ORGANIZED HEALTH CARE EDUCATION/TRAINING PROGRAM

## 2023-06-20 NOTE — PROGRESS NOTES
°F (36.5 °C) (Temporal)   Ht 6' (1.829 m)   Wt 200 lb (90.7 kg)   SpO2 97%   BMI 27.12 kg/m²     GENERAL: No acute distress, alert and oriented  EYES: EOMI, Anti-icteric  NOSE: On anterior rhinoscopy there is no epistaxis, nasal mucosa moist and normal appearing, no purulent drainage. EARS: Normal external appearance; on portable otomicroscopy:     -Ad: External auditory canal without stenosis, tympanic membrane clear, no middle ear effusions or retractions.      -As: External auditory canal without stenosis, tympanic membrane clear, no middle ear effusions or retractions. Pneumatic otoscopy: Bilateral tympanic membranes mobile pneumatic otoscopy  FACE: HB 1/6 bilaterally, symmetric appearing, sensation equal bilaterally  ORAL CAVITY: No masses or lesions visualized or palpated, uvula is midline, moist mucous membranes, no oropharyngeal masses or oropharyngeal obstruction. Tonsils 2+ bilaterally, no tonsil stones identified. NECK: Normal range of motion, no thyromegaly, trachea is midline, no palpable lymphadenopathy or neck masses, no crepitus  NEURO: Cranial Nerves 2, 3, 4, 5, 6, 7, 11, 12 grossly intact bilaterally     I have performed a head and neck physical exam personally or was physically present during the key or critical portions of the service. Data/Imaging Review        Media Information      Document Information    [facility] Clinical:  Audiology   audiogram and tymp 6/20/23 06/20/2023   Attached To:   Procedure visit on 6/20/23 with Tonia Cohen, [id number], AuD  [facility] Audiology       Assessment and Plan     1. Tinnitus of both ears  -Faint and mild, minimally bothersome. Recommend masking techniques. 2. Sensorineural hearing loss (SNHL) of both ears  -Reviewed audiogram with the patient. Demonstrating isolated high-frequency mild sensorineural hearing loss. - Recommend hearing protection and loud noise environments.   Repeat audiogram in

## 2023-06-20 NOTE — PROGRESS NOTES
Amy Chung   1970, 48 y.o. male   [id number]       Referring Provider: Rica Hermosillo MD   Referral Type: In an order in [address] [place] [address]    Reason for Visit: Evaluation of suspected change in hearing, tinnitus, or balance. ADULT AUDIOLOGIC EVALUATION      Amy Chung is a 48 y.o. male seen today, 6/20/2023 , for an initial audiologic evaluation. Patient was seen by Rica Hermosillo MD  following today's evaluation. AUDIOLOGIC AND OTHER PERTINENT MEDICAL HISTORY:      Amy Chung reports bilateral intermittent high-pitched tinnitus that started a month ago. He says it is non-bothersome and non-pulsatile. Denies any hearing changes or hearing difficulty. Has history of sinus issues. Family history of hearing loss present in his father. He denied otalgia, aural fullness, otorrhea, dizziness, imbalance, history of noise exposure, history of head trauma, and history of ear surgery    Date: 6/20/2023     IMPRESSIONS:      Today's results revealed symmetric sensorineural hearing loss bilaterally. Tympanometry indicates normal middle ear function right, low movement of tympanic membrane/eardrum in the left. Discussed test results and implications with patient. Discussed possible benefits of amplification. Discussed use of tinnitus management strategies. Provided basic tinnitus education, including the neurophysiological model. Follow medical recommendations of Kevin Mayo DO.     ASSESSMENT AND FINDINGS:     Otoscopy unremarkable. RIGHT EAR:  Hearing Sensitivity: Normal sloping to mild sensorineural hearing loss   Speech Recognition Threshold: 20 dB HL  Word Recognition: Excellent 100%, based on NU-6 25-word list at 60 dBHL using recorded speech stimuli. Tympanometry: Normal peak pressure and compliance, Type A tympanogram, consistent with normal middle ear function.        LEFT EAR:  Hearing Sensitivity: Normal sloping to moderate sensorineural hearing loss   Speech Recognition Threshold: 15

## 2023-07-01 DIAGNOSIS — E78.2 MIXED HYPERLIPIDEMIA: ICD-10-CM

## 2023-07-01 DIAGNOSIS — R80.9 MICROALBUMINURIA: ICD-10-CM

## 2023-07-03 RX ORDER — LISINOPRIL 5 MG/1
TABLET ORAL
Qty: 90 TABLET | Refills: 0 | Status: SHIPPED | OUTPATIENT
Start: 2023-07-03

## 2023-07-03 RX ORDER — ROSUVASTATIN CALCIUM 40 MG/1
TABLET, COATED ORAL
Qty: 90 TABLET | Refills: 0 | Status: SHIPPED | OUTPATIENT
Start: 2023-07-03

## 2023-09-13 DIAGNOSIS — R80.9 MICROALBUMINURIA: ICD-10-CM

## 2023-09-13 DIAGNOSIS — E78.2 MIXED HYPERLIPIDEMIA: ICD-10-CM

## 2023-09-14 RX ORDER — ROSUVASTATIN CALCIUM 40 MG/1
TABLET, COATED ORAL
Qty: 90 TABLET | Refills: 0 | Status: SHIPPED | OUTPATIENT
Start: 2023-09-14

## 2023-09-14 RX ORDER — LISINOPRIL 5 MG/1
TABLET ORAL
Qty: 90 TABLET | Refills: 0 | Status: SHIPPED | OUTPATIENT
Start: 2023-09-14

## 2023-09-14 NOTE — TELEPHONE ENCOUNTER
Medication:   Requested Prescriptions     Pending Prescriptions Disp Refills    rosuvastatin (CRESTOR) 40 MG tablet [Pharmacy Med Name: ROSUVASTATIN TAB 40MG] 90 tablet 0     Sig: TAKE 1 TABLET DAILY    lisinopril (PRINIVIL;ZESTRIL) 5 MG tablet [Pharmacy Med Name: LISINOPRIL TAB 5MG] 90 tablet 0     Sig: TAKE 1 TABLET DAILY     Last Filled:  7.3.23    Last appt: 5/22/2023   Next appt: 9/26/2023    Last Lipid:   Lab Results   Component Value Date/Time    CHOL 173 01/23/2023 09:13 AM    TRIG 52 01/23/2023 09:13 AM    HDL 64 01/23/2023 09:13 AM    HDL 54 05/03/2012 10:05 AM    LDLCALC 99 01/23/2023 09:13 AM

## 2023-09-18 DIAGNOSIS — E11.9 TYPE 2 DIABETES MELLITUS WITHOUT COMPLICATION, WITHOUT LONG-TERM CURRENT USE OF INSULIN (HCC): ICD-10-CM

## 2023-09-18 DIAGNOSIS — E78.2 MIXED HYPERLIPIDEMIA: ICD-10-CM

## 2023-09-18 LAB
ALBUMIN SERPL-MCNC: 4.7 G/DL (ref 3.4–5)
ALBUMIN/GLOB SERPL: 2 {RATIO} (ref 1.1–2.2)
ALP SERPL-CCNC: 66 U/L (ref 40–129)
ALT SERPL-CCNC: 29 U/L (ref 10–40)
ANION GAP SERPL CALCULATED.3IONS-SCNC: 9 MMOL/L (ref 3–16)
AST SERPL-CCNC: 25 U/L (ref 15–37)
BILIRUB SERPL-MCNC: 0.6 MG/DL (ref 0–1)
BUN SERPL-MCNC: 17 MG/DL (ref 7–20)
CALCIUM SERPL-MCNC: 9.5 MG/DL (ref 8.3–10.6)
CHLORIDE SERPL-SCNC: 102 MMOL/L (ref 99–110)
CHOLEST SERPL-MCNC: 170 MG/DL (ref 0–199)
CO2 SERPL-SCNC: 30 MMOL/L (ref 21–32)
CREAT SERPL-MCNC: 1.1 MG/DL (ref 0.9–1.3)
GFR SERPLBLD CREATININE-BSD FMLA CKD-EPI: >60 ML/MIN/{1.73_M2}
GLUCOSE SERPL-MCNC: 124 MG/DL (ref 70–99)
HDLC SERPL-MCNC: 58 MG/DL (ref 40–60)
LDLC SERPL CALC-MCNC: 97 MG/DL
POTASSIUM SERPL-SCNC: 4.5 MMOL/L (ref 3.5–5.1)
PROT SERPL-MCNC: 7.1 G/DL (ref 6.4–8.2)
SODIUM SERPL-SCNC: 141 MMOL/L (ref 136–145)
TRIGL SERPL-MCNC: 73 MG/DL (ref 0–150)
VLDLC SERPL CALC-MCNC: 15 MG/DL

## 2023-09-19 LAB
EST. AVERAGE GLUCOSE BLD GHB EST-MCNC: 139.9 MG/DL
HBA1C MFR BLD: 6.5 %

## 2023-09-23 SDOH — ECONOMIC STABILITY: INCOME INSECURITY: HOW HARD IS IT FOR YOU TO PAY FOR THE VERY BASICS LIKE FOOD, HOUSING, MEDICAL CARE, AND HEATING?: NOT HARD AT ALL

## 2023-09-23 SDOH — ECONOMIC STABILITY: FOOD INSECURITY: WITHIN THE PAST 12 MONTHS, THE FOOD YOU BOUGHT JUST DIDN'T LAST AND YOU DIDN'T HAVE MONEY TO GET MORE.: NEVER TRUE

## 2023-09-23 SDOH — ECONOMIC STABILITY: FOOD INSECURITY: WITHIN THE PAST 12 MONTHS, YOU WORRIED THAT YOUR FOOD WOULD RUN OUT BEFORE YOU GOT MONEY TO BUY MORE.: NEVER TRUE

## 2023-09-26 ENCOUNTER — OFFICE VISIT (OUTPATIENT)
Dept: PRIMARY CARE CLINIC | Age: 53
End: 2023-09-26
Payer: COMMERCIAL

## 2023-09-26 VITALS
DIASTOLIC BLOOD PRESSURE: 84 MMHG | WEIGHT: 200 LBS | SYSTOLIC BLOOD PRESSURE: 126 MMHG | BODY MASS INDEX: 27.12 KG/M2 | HEART RATE: 74 BPM | OXYGEN SATURATION: 98 %

## 2023-09-26 DIAGNOSIS — E78.2 MIXED HYPERLIPIDEMIA: ICD-10-CM

## 2023-09-26 DIAGNOSIS — E11.9 TYPE 2 DIABETES MELLITUS WITHOUT COMPLICATION, WITHOUT LONG-TERM CURRENT USE OF INSULIN (HCC): Primary | ICD-10-CM

## 2023-09-26 DIAGNOSIS — Z00.00 ENCOUNTER FOR WELL ADULT EXAM WITHOUT ABNORMAL FINDINGS: ICD-10-CM

## 2023-09-26 DIAGNOSIS — J30.9 ALLERGIC RHINITIS, UNSPECIFIED SEASONALITY, UNSPECIFIED TRIGGER: ICD-10-CM

## 2023-09-26 DIAGNOSIS — Z12.5 SCREENING PSA (PROSTATE SPECIFIC ANTIGEN): ICD-10-CM

## 2023-09-26 DIAGNOSIS — Z23 NEED FOR INFLUENZA VACCINATION: ICD-10-CM

## 2023-09-26 PROCEDURE — 3078F DIAST BP <80 MM HG: CPT | Performed by: INTERNAL MEDICINE

## 2023-09-26 PROCEDURE — 90471 IMMUNIZATION ADMIN: CPT | Performed by: INTERNAL MEDICINE

## 2023-09-26 PROCEDURE — 3044F HG A1C LEVEL LT 7.0%: CPT | Performed by: INTERNAL MEDICINE

## 2023-09-26 PROCEDURE — 90674 CCIIV4 VAC NO PRSV 0.5 ML IM: CPT | Performed by: INTERNAL MEDICINE

## 2023-09-26 PROCEDURE — 3074F SYST BP LT 130 MM HG: CPT | Performed by: INTERNAL MEDICINE

## 2023-09-26 PROCEDURE — 99214 OFFICE O/P EST MOD 30 MIN: CPT | Performed by: INTERNAL MEDICINE

## 2023-09-26 RX ORDER — GLUCOSAMINE HCL/CHONDROITIN SU 500-400 MG
CAPSULE ORAL
Qty: 100 STRIP | Refills: 3 | Status: SHIPPED | OUTPATIENT
Start: 2023-09-26

## 2023-09-26 RX ORDER — LANCETS 30 GAUGE
EACH MISCELLANEOUS
Qty: 100 EACH | Refills: 3 | Status: SHIPPED | OUTPATIENT
Start: 2023-09-26

## 2023-09-26 NOTE — PROGRESS NOTES
Date of Visit: 2023    Dinorah Delacruz (:  1970) is a 48 y.o. male,  Established patient here for evaluation of the following chief complaint(s):  Diabetes, Cholesterol Problem, and Allergic Rhinitis       ASSESSMENT/PLAN:    1. Type 2 diabetes mellitus without complication, without long-term current use of insulin (HCC)  -Hemoglobin A1c of 6.5% shows diabetes is controlled  -Continue diet control  -Continue Lisinopril 5mg once daily for renal protection  -Limit carbohydrates to 45 grams with meals and 15 grams with snacks  -monitor blood sugars  -goal for blood sugar fasting or pre-meal  is   -goal for blood sugar 2 hours after a meal is less than 180  -goal for blood sugar at bedtime is less than 150  -Regular aerobic exercise  -Lancets MISC; Use to test blood sugar once daily  Dispense: 100 each; Refill: 3  -blood glucose monitor strips; Test once daily  Dispense: 100 strip; Refill: 3  -HM DIABETES FOOT EXAM    2. Mixed hyperlipidemia  -stable  -LDL is at goal  -Continue rosuvastatin (CRESTOR) 40 MG tablet; Take 1 tablet by mouth daily   -Low fat, low cholesterol diet  -Regular aerobic exercise    3. Allergic rhinitis, unspecified seasonality, unspecified trigger  -stable  -Continue Flonase nasal spray as needed  -Continue Mucinex D as needed    4. Need for influenza vaccinati  - Influenza, FLUCELVAX, (age 10 mo+), IM, Preservative Free, 0.5 mL given        Return in about 4 months (around 2024) for annual physical exam.    SUBJECTIVE:    Patient has Type 2 diabetes. Patient is diet controlled. Patient tries to decrease carbohydrates. Patient monitors his blood sugar fasting and it averages 120. Patient walks 3.5-4 miles once a week and plays basketball 3 days per week. Patient states his eye exam is sched for December. Patient has hyperlipidemia. Patient takes Rosuvastatin 40mg nightly. Patient decreases fat and cholesterol. Patient has allergic rhinitis.  Patient takes Flonase

## 2023-09-26 NOTE — PATIENT INSTRUCTIONS
-Low fat, low cholesterol diet  -Limit carbohydrates to 45 grams with meals and 15 grams with snacks  -monitor blood sugars  -goal for blood sugar fasting or pre-meal  is   -goal for blood sugar 2 hours after a meal is less than 180  -goal for blood sugar at bedtime is less than 150  -Regular aerobic exercise

## 2023-09-29 ASSESSMENT — ENCOUNTER SYMPTOMS
VOMITING: 0
CONSTIPATION: 0
ABDOMINAL PAIN: 0
NAUSEA: 0
CHEST TIGHTNESS: 0
WHEEZING: 0
RHINORRHEA: 0
EYE ITCHING: 1
DIARRHEA: 0
COUGH: 0
SORE THROAT: 0
SHORTNESS OF BREATH: 0

## 2023-10-11 ENCOUNTER — PATIENT MESSAGE (OUTPATIENT)
Dept: PRIMARY CARE CLINIC | Age: 53
End: 2023-10-11

## 2024-02-23 DIAGNOSIS — R80.9 MICROALBUMINURIA: ICD-10-CM

## 2024-02-23 RX ORDER — LISINOPRIL 5 MG/1
5 TABLET ORAL DAILY
Qty: 90 TABLET | Refills: 0 | Status: SHIPPED | OUTPATIENT
Start: 2024-02-23

## 2024-02-23 NOTE — TELEPHONE ENCOUNTER
Medication:   Requested Prescriptions     Pending Prescriptions Disp Refills    lisinopril (PRINIVIL;ZESTRIL) 5 MG tablet 90 tablet 0     Sig: Take 1 tablet by mouth daily     Last filled: 9/14/23  Last appt: 9/26/2023   Next appt: 4/11/2024    Last OARRS:        No data to display

## 2024-04-09 DIAGNOSIS — Z12.5 SCREENING PSA (PROSTATE SPECIFIC ANTIGEN): ICD-10-CM

## 2024-04-09 DIAGNOSIS — E11.9 TYPE 2 DIABETES MELLITUS WITHOUT COMPLICATION, WITHOUT LONG-TERM CURRENT USE OF INSULIN (HCC): ICD-10-CM

## 2024-04-09 DIAGNOSIS — Z00.00 ENCOUNTER FOR WELL ADULT EXAM WITHOUT ABNORMAL FINDINGS: ICD-10-CM

## 2024-04-09 DIAGNOSIS — E78.2 MIXED HYPERLIPIDEMIA: ICD-10-CM

## 2024-04-09 LAB
ALBUMIN SERPL-MCNC: 4.6 G/DL (ref 3.4–5)
ALBUMIN/GLOB SERPL: 2 {RATIO} (ref 1.1–2.2)
ALP SERPL-CCNC: 71 U/L (ref 40–129)
ALT SERPL-CCNC: 25 U/L (ref 10–40)
ANION GAP SERPL CALCULATED.3IONS-SCNC: 8 MMOL/L (ref 3–16)
AST SERPL-CCNC: 24 U/L (ref 15–37)
BASOPHILS # BLD: 0 K/UL (ref 0–0.2)
BASOPHILS NFR BLD: 0.6 %
BILIRUB SERPL-MCNC: 0.6 MG/DL (ref 0–1)
BUN SERPL-MCNC: 13 MG/DL (ref 7–20)
CALCIUM SERPL-MCNC: 9.7 MG/DL (ref 8.3–10.6)
CHLORIDE SERPL-SCNC: 103 MMOL/L (ref 99–110)
CHOLEST SERPL-MCNC: 175 MG/DL (ref 0–199)
CO2 SERPL-SCNC: 29 MMOL/L (ref 21–32)
CREAT SERPL-MCNC: 1.2 MG/DL (ref 0.9–1.3)
CREAT UR-MCNC: 241.5 MG/DL (ref 39–259)
DEPRECATED RDW RBC AUTO: 14.3 % (ref 12.4–15.4)
EOSINOPHIL # BLD: 0.2 K/UL (ref 0–0.6)
EOSINOPHIL NFR BLD: 3.2 %
GFR SERPLBLD CREATININE-BSD FMLA CKD-EPI: 73 ML/MIN/{1.73_M2}
GLUCOSE SERPL-MCNC: 125 MG/DL (ref 70–99)
HCT VFR BLD AUTO: 43.4 % (ref 40.5–52.5)
HDLC SERPL-MCNC: 59 MG/DL (ref 40–60)
HGB BLD-MCNC: 14.2 G/DL (ref 13.5–17.5)
LDLC SERPL CALC-MCNC: 103 MG/DL
LYMPHOCYTES # BLD: 1.6 K/UL (ref 1–5.1)
LYMPHOCYTES NFR BLD: 28.9 %
MCH RBC QN AUTO: 27.8 PG (ref 26–34)
MCHC RBC AUTO-ENTMCNC: 32.7 G/DL (ref 31–36)
MCV RBC AUTO: 85.1 FL (ref 80–100)
MICROALBUMIN UR DL<=1MG/L-MCNC: <1.2 MG/DL
MICROALBUMIN/CREAT UR: NORMAL MG/G (ref 0–30)
MONOCYTES # BLD: 0.4 K/UL (ref 0–1.3)
MONOCYTES NFR BLD: 7.7 %
NEUTROPHILS # BLD: 3.3 K/UL (ref 1.7–7.7)
NEUTROPHILS NFR BLD: 59.6 %
PLATELET # BLD AUTO: 187 K/UL (ref 135–450)
PMV BLD AUTO: 9.6 FL (ref 5–10.5)
POTASSIUM SERPL-SCNC: 4.1 MMOL/L (ref 3.5–5.1)
PROT SERPL-MCNC: 6.9 G/DL (ref 6.4–8.2)
PSA SERPL DL<=0.01 NG/ML-MCNC: 0.84 NG/ML (ref 0–4)
RBC # BLD AUTO: 5.1 M/UL (ref 4.2–5.9)
SODIUM SERPL-SCNC: 140 MMOL/L (ref 136–145)
TRIGL SERPL-MCNC: 66 MG/DL (ref 0–150)
TSH SERPL DL<=0.005 MIU/L-ACNC: 2.43 UIU/ML (ref 0.27–4.2)
VLDLC SERPL CALC-MCNC: 13 MG/DL
WBC # BLD AUTO: 5.5 K/UL (ref 4–11)

## 2024-04-10 LAB
EST. AVERAGE GLUCOSE BLD GHB EST-MCNC: 142.7 MG/DL
HBA1C MFR BLD: 6.6 %

## 2024-04-10 ASSESSMENT — PATIENT HEALTH QUESTIONNAIRE - PHQ9
SUM OF ALL RESPONSES TO PHQ9 QUESTIONS 1 & 2: 0
SUM OF ALL RESPONSES TO PHQ QUESTIONS 1-9: 0
2. FEELING DOWN, DEPRESSED OR HOPELESS: NOT AT ALL
2. FEELING DOWN, DEPRESSED OR HOPELESS: NOT AT ALL
SUM OF ALL RESPONSES TO PHQ9 QUESTIONS 1 & 2: 0
SUM OF ALL RESPONSES TO PHQ QUESTIONS 1-9: 0
1. LITTLE INTEREST OR PLEASURE IN DOING THINGS: NOT AT ALL
1. LITTLE INTEREST OR PLEASURE IN DOING THINGS: NOT AT ALL
SUM OF ALL RESPONSES TO PHQ QUESTIONS 1-9: 0
SUM OF ALL RESPONSES TO PHQ QUESTIONS 1-9: 0

## 2024-04-11 ENCOUNTER — OFFICE VISIT (OUTPATIENT)
Dept: PRIMARY CARE CLINIC | Age: 54
End: 2024-04-11
Payer: COMMERCIAL

## 2024-04-11 VITALS
BODY MASS INDEX: 27.5 KG/M2 | HEART RATE: 62 BPM | DIASTOLIC BLOOD PRESSURE: 82 MMHG | TEMPERATURE: 97.4 F | SYSTOLIC BLOOD PRESSURE: 122 MMHG | OXYGEN SATURATION: 98 % | WEIGHT: 203 LBS | RESPIRATION RATE: 16 BRPM | HEIGHT: 72 IN

## 2024-04-11 DIAGNOSIS — Z23 NEED FOR HEPATITIS B VACCINATION: ICD-10-CM

## 2024-04-11 DIAGNOSIS — Z12.5 PROSTATE CANCER SCREENING: ICD-10-CM

## 2024-04-11 DIAGNOSIS — J30.9 ALLERGIC RHINITIS, UNSPECIFIED SEASONALITY, UNSPECIFIED TRIGGER: ICD-10-CM

## 2024-04-11 DIAGNOSIS — Z80.42 FAMILY HISTORY OF PROSTATE CANCER IN FATHER: ICD-10-CM

## 2024-04-11 DIAGNOSIS — E11.9 TYPE 2 DIABETES MELLITUS WITHOUT COMPLICATION, WITHOUT LONG-TERM CURRENT USE OF INSULIN (HCC): ICD-10-CM

## 2024-04-11 DIAGNOSIS — Z00.00 ENCOUNTER FOR WELL ADULT EXAM WITHOUT ABNORMAL FINDINGS: Primary | ICD-10-CM

## 2024-04-11 DIAGNOSIS — E78.2 MIXED HYPERLIPIDEMIA: ICD-10-CM

## 2024-04-11 DIAGNOSIS — R80.9 MICROALBUMINURIA: ICD-10-CM

## 2024-04-11 PROCEDURE — 90739 HEPB VACC 2/4 DOSE ADULT IM: CPT | Performed by: INTERNAL MEDICINE

## 2024-04-11 PROCEDURE — 90471 IMMUNIZATION ADMIN: CPT | Performed by: INTERNAL MEDICINE

## 2024-04-11 PROCEDURE — 3074F SYST BP LT 130 MM HG: CPT | Performed by: INTERNAL MEDICINE

## 2024-04-11 PROCEDURE — 99396 PREV VISIT EST AGE 40-64: CPT | Performed by: INTERNAL MEDICINE

## 2024-04-11 PROCEDURE — 3079F DIAST BP 80-89 MM HG: CPT | Performed by: INTERNAL MEDICINE

## 2024-04-11 RX ORDER — LISINOPRIL 5 MG/1
5 TABLET ORAL DAILY
Qty: 90 TABLET | Refills: 2 | Status: SHIPPED | OUTPATIENT
Start: 2024-04-11

## 2024-04-11 RX ORDER — ROSUVASTATIN CALCIUM 40 MG/1
40 TABLET, COATED ORAL DAILY
Qty: 90 TABLET | Refills: 2 | Status: SHIPPED | OUTPATIENT
Start: 2024-04-11

## 2024-04-11 NOTE — PROGRESS NOTES
Hyperlipidemia     Influenza A 03/10/2016    Prediabetes 04/03/2017    Type 2 diabetes mellitus without complication (HCC) August 2022       Past Surgical History:   Procedure Laterality Date    HERNIA REPAIR  age 4    WISDOM TOOTH EXTRACTION  age 18         Family History   Problem Relation Age of Onset    Mult Sclerosis Mother 50    Heart Disease Father         3V CABG age 78    High Cholesterol Father     Prostate Cancer Father 75    Glaucoma Sister     Mult Sclerosis Brother 35    Diabetes Maternal Grandmother     Diabetes Maternal Aunt     Diabetes Maternal Uncle     Hypertension Neg Hx        Social History     Tobacco Use    Smoking status: Never    Smokeless tobacco: Never    Tobacco comments:     I have never smoked   Vaping Use    Vaping Use: Never used   Substance Use Topics    Alcohol use: Yes     Alcohol/week: 3.0 standard drinks of alcohol     Types: 3 Cans of beer per week     Comment: I usually have no more than 2 - 3 drinks in a week.    Drug use: No       Objective     Vital Signs  /82   Pulse 62   Temp 97.4 °F (36.3 °C)   Resp 16   Ht 1.829 m (6')   Wt 92.1 kg (203 lb)   SpO2 98%   BMI 27.53 kg/m²   Wt Readings from Last 3 Encounters:   04/11/24 92.1 kg (203 lb)   09/26/23 90.7 kg (200 lb)   06/20/23 90.7 kg (200 lb)       Waist Circumference  Additional Measurements    04/11/24 1303   Waist (Inches): 34 in       Physical Exam  Vitals and nursing note reviewed.   Constitutional:       General: He is not in acute distress.     Appearance: Normal appearance.   HENT:      Head: Normocephalic and atraumatic.      Right Ear: Tympanic membrane, ear canal and external ear normal.      Left Ear: Tympanic membrane, ear canal and external ear normal.      Nose: Nose normal.      Mouth/Throat:      Pharynx: Oropharynx is clear.   Eyes:      Extraocular Movements: Extraocular movements intact.      Conjunctiva/sclera: Conjunctivae normal.      Pupils: Pupils are equal, round, and reactive to

## 2024-10-29 DIAGNOSIS — E11.9 TYPE 2 DIABETES MELLITUS WITHOUT COMPLICATION, WITHOUT LONG-TERM CURRENT USE OF INSULIN (HCC): ICD-10-CM

## 2024-10-29 DIAGNOSIS — E78.2 MIXED HYPERLIPIDEMIA: ICD-10-CM

## 2024-10-29 LAB
ALBUMIN SERPL-MCNC: 4.7 G/DL (ref 3.4–5)
ALBUMIN/GLOB SERPL: 1.9 {RATIO} (ref 1.1–2.2)
ALP SERPL-CCNC: 70 U/L (ref 40–129)
ALT SERPL-CCNC: 24 U/L (ref 10–40)
ANION GAP SERPL CALCULATED.3IONS-SCNC: 9 MMOL/L (ref 3–16)
AST SERPL-CCNC: 25 U/L (ref 15–37)
BILIRUB SERPL-MCNC: 0.5 MG/DL (ref 0–1)
BUN SERPL-MCNC: 14 MG/DL (ref 7–20)
CALCIUM SERPL-MCNC: 10.1 MG/DL (ref 8.3–10.6)
CHLORIDE SERPL-SCNC: 102 MMOL/L (ref 99–110)
CHOLEST SERPL-MCNC: 168 MG/DL (ref 0–199)
CO2 SERPL-SCNC: 29 MMOL/L (ref 21–32)
CREAT SERPL-MCNC: 1.2 MG/DL (ref 0.9–1.3)
EST. AVERAGE GLUCOSE BLD GHB EST-MCNC: 148.5 MG/DL
GFR SERPLBLD CREATININE-BSD FMLA CKD-EPI: 72 ML/MIN/{1.73_M2}
GLUCOSE SERPL-MCNC: 127 MG/DL (ref 70–99)
HBA1C MFR BLD: 6.8 %
HDLC SERPL-MCNC: 56 MG/DL (ref 40–60)
LDLC SERPL CALC-MCNC: 98 MG/DL
POTASSIUM SERPL-SCNC: 4.6 MMOL/L (ref 3.5–5.1)
PROT SERPL-MCNC: 7.2 G/DL (ref 6.4–8.2)
SODIUM SERPL-SCNC: 140 MMOL/L (ref 136–145)
TRIGL SERPL-MCNC: 70 MG/DL (ref 0–150)
VLDLC SERPL CALC-MCNC: 14 MG/DL

## 2024-10-31 ENCOUNTER — OFFICE VISIT (OUTPATIENT)
Dept: PRIMARY CARE CLINIC | Age: 54
End: 2024-10-31

## 2024-10-31 VITALS
HEIGHT: 73 IN | OXYGEN SATURATION: 96 % | BODY MASS INDEX: 25.71 KG/M2 | WEIGHT: 194 LBS | SYSTOLIC BLOOD PRESSURE: 126 MMHG | TEMPERATURE: 96.9 F | HEART RATE: 72 BPM | DIASTOLIC BLOOD PRESSURE: 86 MMHG | RESPIRATION RATE: 16 BRPM

## 2024-10-31 DIAGNOSIS — Z23 NEED FOR INFLUENZA VACCINATION: ICD-10-CM

## 2024-10-31 DIAGNOSIS — E11.9 TYPE 2 DIABETES MELLITUS WITHOUT COMPLICATION, WITHOUT LONG-TERM CURRENT USE OF INSULIN (HCC): Primary | ICD-10-CM

## 2024-10-31 DIAGNOSIS — Z12.5 PROSTATE CANCER SCREENING: ICD-10-CM

## 2024-10-31 DIAGNOSIS — E78.2 MIXED HYPERLIPIDEMIA: ICD-10-CM

## 2024-10-31 DIAGNOSIS — Z00.00 ENCOUNTER FOR WELL ADULT EXAM WITHOUT ABNORMAL FINDINGS: ICD-10-CM

## 2024-10-31 DIAGNOSIS — M25.522 LEFT ELBOW PAIN: ICD-10-CM

## 2024-10-31 SDOH — ECONOMIC STABILITY: FOOD INSECURITY: WITHIN THE PAST 12 MONTHS, THE FOOD YOU BOUGHT JUST DIDN'T LAST AND YOU DIDN'T HAVE MONEY TO GET MORE.: NEVER TRUE

## 2024-10-31 SDOH — ECONOMIC STABILITY: INCOME INSECURITY: HOW HARD IS IT FOR YOU TO PAY FOR THE VERY BASICS LIKE FOOD, HOUSING, MEDICAL CARE, AND HEATING?: NOT HARD AT ALL

## 2024-10-31 SDOH — ECONOMIC STABILITY: FOOD INSECURITY: WITHIN THE PAST 12 MONTHS, YOU WORRIED THAT YOUR FOOD WOULD RUN OUT BEFORE YOU GOT MONEY TO BUY MORE.: NEVER TRUE

## 2024-10-31 ASSESSMENT — PATIENT HEALTH QUESTIONNAIRE - PHQ9
SUM OF ALL RESPONSES TO PHQ QUESTIONS 1-9: 0
2. FEELING DOWN, DEPRESSED OR HOPELESS: NOT AT ALL
1. LITTLE INTEREST OR PLEASURE IN DOING THINGS: NOT AT ALL
SUM OF ALL RESPONSES TO PHQ9 QUESTIONS 1 & 2: 0
SUM OF ALL RESPONSES TO PHQ QUESTIONS 1-9: 0

## 2024-10-31 NOTE — PATIENT INSTRUCTIONS
-Continue same medications  -Low fat, low cholesterol diet  -Limit carbohydrates to 45 grams with meals and 15 grams with snacks  -monitor blood sugars  -goal for blood sugar fasting or pre-meal  is   -goal for blood sugar 2 hours after a meal is less than 180  -goal for blood sugar at bedtime is less than 150  -Regular aerobic exercise

## 2024-10-31 NOTE — PROGRESS NOTES
Date of Visit: 10/31/2024    Yasir Hoskins (:  1970) is a 54 y.o. male,  Established patient here for evaluation of the following chief complaint(s):  Diabetes, Hyperlipidemia, and Elbow Injury      ASSESSMENT/PLAN:    1. Type 2 diabetes mellitus without complication, without long-term current use of insulin ([facility])  Assessment & Plan:  -Hemoglobin A1c of  6.8% shows diabetes is controlled  -Continue diet control  -continue Lisinopril 5mg once daily for renal protection  -Limit carbohydrates to 45 grams with meals and 15 grams with snacks  -monitor blood sugars  -goal for blood sugar fasting or pre-meal  is   -goal for blood sugar 2 hours after a meal is less than 180  -goal for blood sugar at bedtime is less than 150  -Regular aerobic exercise  Orders:  -      DIABETES FOOT EXAM  2. Mixed hyperlipidemia  Assessment & Plan:  -stable  -LDL is at goal  -Continue rosuvastatin 40mg once daily  -Low fat, low cholesterol diet  -Regular aerobic exercise   3. Left elbow pain  Assessment & Plan:  -not controlled  -continue Ibuprofen as needed  -Referral to Orthopedics   Orders:  -     Misael Nettles MD, Orthopedics and Sports Medicine (Knee; Shoulder), [place]  4. Need for influenza vaccination  Assessment & Plan:  -Flu shot given   Orders:  -     Influenza, FLUCELVAX Trivalent, (age 6 mo+) IM, Preservative Free, 0.5mL      Return in about 5 months (around 2025) for annual physical exam.    SUBJECTIVE:    Patient has Type 2 diabetes. Patient is diet controlled. Patient takes Lisinopril 5mg once daily for renal protection. Patient states his fasting blood sugar averages 130. Patient states if his fasting is higher than 130 he tests 2 hours after a meal and it is not usually more than 140. Patient decreases carbohydrates. Patient states he has decreased sweet tea and chips. Patient states he limits alcohol to weekends or only once during the week. Patient states he has lost weight due to

## 2024-11-10 SDOH — HEALTH STABILITY: PHYSICAL HEALTH: ON AVERAGE, HOW MANY MINUTES DO YOU ENGAGE IN EXERCISE AT THIS LEVEL?: 80 MIN

## 2024-11-10 SDOH — HEALTH STABILITY: PHYSICAL HEALTH: ON AVERAGE, HOW MANY DAYS PER WEEK DO YOU ENGAGE IN MODERATE TO STRENUOUS EXERCISE (LIKE A BRISK WALK)?: 3 DAYS

## 2024-11-13 ENCOUNTER — OFFICE VISIT (OUTPATIENT)
Dept: ORTHOPEDIC SURGERY | Age: 54
End: 2024-11-13
Payer: COMMERCIAL

## 2024-11-13 VITALS — WEIGHT: 195 LBS | HEIGHT: 72 IN | BODY MASS INDEX: 26.41 KG/M2

## 2024-11-13 DIAGNOSIS — M25.521 RIGHT ELBOW PAIN: Primary | ICD-10-CM

## 2024-11-13 PROCEDURE — 99214 OFFICE O/P EST MOD 30 MIN: CPT | Performed by: ORTHOPAEDIC SURGERY

## 2024-11-13 NOTE — PROGRESS NOTES
Date:  2024    Name:  Yasir Hoskins  Address:  [address] [place] [id number]    :  1970      Age:   54 y.o.    SSN:  [id number]      Medical Record Number:  [id number]    Reason for Visit:    Chief Complaint    Elbow Pain (Old patient / new problem right elbow )      DOS:2024     HPI: Yasir Hoskins is a 54 y.o. male here today for right elbow pain that he reports is similar to his prior left elbow pain when he was diagnosed with lateral epicondylitis.  He has been ongoing for about 2 months and has begun using the brace that he had for his left elbow.  He reports that he still has good function of the right upper extremity, but some soreness after extended use.  Denies numbness and tingling.      Pain Assessment  Location of Pain: Elbow  Location Modifiers: Right  Severity of Pain: 1  Quality of Pain: Dull  Duration of Pain: A few minutes  Frequency of Pain: Intermittent  Aggravating Factors:  (no aggravating factors)  Limiting Behavior: No  Relieving Factors: Rest  Result of Injury: No  Work-Related Injury: No  Are there other pain locations you wish to document?: No  ROS: Review of systems reviewed from Patient History Form completed today and available in the patient's chart under the Media tab.       Past Medical History:   Diagnosis Date    Allergic rhinitis 10/03/2016    Hyperlipidemia     Influenza A 03/10/2016    Prediabetes 2017    Type 2 diabetes mellitus without complication (HCC) 2022        Past Surgical History:   Procedure Laterality Date    HERNIA REPAIR  age 4    WISDOM TOOTH EXTRACTION  age 18       Family History   Problem Relation Age of Onset    Mult Sclerosis Mother 50    Heart Disease Father         3V CABG age 78    High Cholesterol Father     Prostate Cancer Father 75    Glaucoma Sister     Mult Sclerosis Brother 35    Diabetes Maternal Grandmother     Diabetes Maternal Aunt     Diabetes Maternal Uncle     Hypertension Neg Hx        Social History

## 2024-11-30 PROBLEM — Z23 NEED FOR INFLUENZA VACCINATION: Status: RESOLVED | Noted: 2024-10-31 | Resolved: 2024-11-30

## 2025-01-05 DIAGNOSIS — R80.9 MICROALBUMINURIA: ICD-10-CM

## 2025-01-07 RX ORDER — LISINOPRIL 5 MG/1
5 TABLET ORAL DAILY
Qty: 90 TABLET | Refills: 2 | Status: SHIPPED | OUTPATIENT
Start: 2025-01-07

## 2025-01-07 NOTE — TELEPHONE ENCOUNTER
Medication:   Requested Prescriptions     Pending Prescriptions Disp Refills    lisinopril (PRINIVIL;ZESTRIL) 5 MG tablet [Pharmacy Med Name: LISINOPRIL TAB 5MG] 90 tablet 2     Sig: TAKE 1 TABLET DAILY     Last Filled:  4/11/2024    Last appt: 10/31/2024   Next appt: Visit date not found    Last OARRS:        No data to display

## 2025-02-09 DIAGNOSIS — E78.2 MIXED HYPERLIPIDEMIA: ICD-10-CM

## 2025-02-10 RX ORDER — ROSUVASTATIN CALCIUM 40 MG/1
40 TABLET, COATED ORAL DAILY
Qty: 90 TABLET | Refills: 0 | Status: SHIPPED | OUTPATIENT
Start: 2025-02-10

## 2025-02-10 NOTE — TELEPHONE ENCOUNTER
Medication:   Requested Prescriptions     Pending Prescriptions Disp Refills    rosuvastatin (CRESTOR) 40 MG tablet [Pharmacy Med Name: ROSUVASTATIN TAB 40MG] 90 tablet 2     Sig: TAKE 1 TABLET DAILY     Last Filled:  4.11.24    Last appt: 10/31/2024   Next appt: Visit date not found    Last Lipid:   Lab Results   Component Value Date/Time    CHOL 168 10/29/2024 10:18 AM    TRIG 70 10/29/2024 10:18 AM    HDL 56 10/29/2024 10:18 AM    HDL 54 05/03/2012 10:05 AM

## 2025-05-03 DIAGNOSIS — E78.2 MIXED HYPERLIPIDEMIA: ICD-10-CM

## 2025-05-05 NOTE — TELEPHONE ENCOUNTER
Medication:   Requested Prescriptions     Pending Prescriptions Disp Refills    rosuvastatin (CRESTOR) 40 MG tablet [Pharmacy Med Name: ROSUVASTATIN TAB 40MG] 90 tablet 0     Sig: TAKE 1 TABLET DAILY     Last Filled:  2/10/25    Last appt: 10/31/2024   Next appt: Visit date not found    Last Lipid:   Lab Results   Component Value Date/Time    CHOL 168 10/29/2024 10:18 AM    TRIG 70 10/29/2024 10:18 AM    HDL 56 10/29/2024 10:18 AM    HDL 54 05/03/2012 10:05 AM

## 2025-05-08 RX ORDER — ROSUVASTATIN CALCIUM 40 MG/1
40 TABLET, COATED ORAL DAILY
Qty: 90 TABLET | Refills: 0 | Status: SHIPPED | OUTPATIENT
Start: 2025-05-08

## 2025-05-09 NOTE — TELEPHONE ENCOUNTER
Call patient to schedule appointment for diabetes, hyperlipidemia, and allergic rhinitis. He was last seen on 10/31/24 and has no appointment scheduled. He is also due for annual physical exam.

## 2025-05-12 DIAGNOSIS — Z12.5 PROSTATE CANCER SCREENING: ICD-10-CM

## 2025-05-12 DIAGNOSIS — Z00.00 ENCOUNTER FOR WELL ADULT EXAM WITHOUT ABNORMAL FINDINGS: ICD-10-CM

## 2025-05-12 DIAGNOSIS — E78.2 MIXED HYPERLIPIDEMIA: ICD-10-CM

## 2025-05-12 DIAGNOSIS — E11.9 TYPE 2 DIABETES MELLITUS WITHOUT COMPLICATION, WITHOUT LONG-TERM CURRENT USE OF INSULIN (HCC): ICD-10-CM

## 2025-05-12 LAB
ALBUMIN SERPL-MCNC: 4.6 G/DL (ref 3.4–5)
ALBUMIN/GLOB SERPL: 2 {RATIO} (ref 1.1–2.2)
ALP SERPL-CCNC: 64 U/L (ref 40–129)
ALT SERPL-CCNC: 25 U/L (ref 10–40)
ANION GAP SERPL CALCULATED.3IONS-SCNC: 10 MMOL/L (ref 3–16)
AST SERPL-CCNC: 26 U/L (ref 15–37)
BACTERIA URNS QL MICRO: NORMAL /HPF
BASOPHILS # BLD: 0 K/UL (ref 0–0.2)
BASOPHILS NFR BLD: 0.8 %
BILIRUB SERPL-MCNC: 0.6 MG/DL (ref 0–1)
BILIRUB UR QL STRIP.AUTO: NEGATIVE
BUN SERPL-MCNC: 17 MG/DL (ref 7–20)
CALCIUM SERPL-MCNC: 9.5 MG/DL (ref 8.3–10.6)
CHLORIDE SERPL-SCNC: 105 MMOL/L (ref 99–110)
CHOLEST SERPL-MCNC: 156 MG/DL (ref 0–199)
CLARITY UR: ABNORMAL
CO2 SERPL-SCNC: 28 MMOL/L (ref 21–32)
COLOR UR: ABNORMAL
CREAT SERPL-MCNC: 1.3 MG/DL (ref 0.9–1.3)
CREAT UR-MCNC: 522 MG/DL (ref 39–259)
DEPRECATED RDW RBC AUTO: 14.2 % (ref 12.4–15.4)
EOSINOPHIL # BLD: 0.1 K/UL (ref 0–0.6)
EOSINOPHIL NFR BLD: 1.4 %
EPI CELLS #/AREA URNS AUTO: 2 /HPF (ref 0–5)
EST. AVERAGE GLUCOSE BLD GHB EST-MCNC: 142.7 MG/DL
GFR SERPLBLD CREATININE-BSD FMLA CKD-EPI: 66 ML/MIN/{1.73_M2}
GLUCOSE SERPL-MCNC: 105 MG/DL (ref 70–99)
GLUCOSE UR STRIP.AUTO-MCNC: NEGATIVE MG/DL
HBA1C MFR BLD: 6.6 %
HCT VFR BLD AUTO: 41.6 % (ref 40.5–52.5)
HDLC SERPL-MCNC: 56 MG/DL (ref 40–60)
HGB BLD-MCNC: 13.9 G/DL (ref 13.5–17.5)
HGB UR QL STRIP.AUTO: NEGATIVE
HYALINE CASTS #/AREA URNS AUTO: 2 /LPF (ref 0–8)
KETONES UR STRIP.AUTO-MCNC: ABNORMAL MG/DL
LDLC SERPL CALC-MCNC: 86 MG/DL
LEUKOCYTE ESTERASE UR QL STRIP.AUTO: NEGATIVE
LYMPHOCYTES # BLD: 1.4 K/UL (ref 1–5.1)
LYMPHOCYTES NFR BLD: 23.8 %
MCH RBC QN AUTO: 28.3 PG (ref 26–34)
MCHC RBC AUTO-ENTMCNC: 33.4 G/DL (ref 31–36)
MCV RBC AUTO: 84.6 FL (ref 80–100)
MICROALBUMIN UR DL<=1MG/L-MCNC: 5.85 MG/DL
MICROALBUMIN/CREAT UR: 11.2 MG/G (ref 0–30)
MONOCYTES # BLD: 0.4 K/UL (ref 0–1.3)
MONOCYTES NFR BLD: 7.4 %
NEUTROPHILS # BLD: 4 K/UL (ref 1.7–7.7)
NEUTROPHILS NFR BLD: 66.6 %
NITRITE UR QL STRIP.AUTO: NEGATIVE
PH UR STRIP.AUTO: 6 [PH] (ref 5–8)
PLATELET # BLD AUTO: 163 K/UL (ref 135–450)
PMV BLD AUTO: 9.8 FL (ref 5–10.5)
POTASSIUM SERPL-SCNC: 4.3 MMOL/L (ref 3.5–5.1)
PROT SERPL-MCNC: 6.9 G/DL (ref 6.4–8.2)
PROT UR STRIP.AUTO-MCNC: 100 MG/DL
PSA SERPL DL<=0.01 NG/ML-MCNC: 0.69 NG/ML (ref 0–4)
RBC # BLD AUTO: 4.92 M/UL (ref 4.2–5.9)
RBC CLUMPS #/AREA URNS AUTO: 2 /HPF (ref 0–4)
SODIUM SERPL-SCNC: 143 MMOL/L (ref 136–145)
SP GR UR STRIP.AUTO: 1.04 (ref 1–1.03)
TRIGL SERPL-MCNC: 68 MG/DL (ref 0–150)
TSH SERPL DL<=0.005 MIU/L-ACNC: 2.97 UIU/ML (ref 0.27–4.2)
UA DIPSTICK W REFLEX MICRO PNL UR: YES
URN SPEC COLLECT METH UR: ABNORMAL
UROBILINOGEN UR STRIP-ACNC: 1 E.U./DL
VLDLC SERPL CALC-MCNC: 14 MG/DL
WBC # BLD AUTO: 6 K/UL (ref 4–11)
WBC #/AREA URNS AUTO: 1 /HPF (ref 0–5)

## 2025-05-13 ASSESSMENT — PATIENT HEALTH QUESTIONNAIRE - PHQ9
SUM OF ALL RESPONSES TO PHQ9 QUESTIONS 1 & 2: 0
1. LITTLE INTEREST OR PLEASURE IN DOING THINGS: NOT AT ALL
SUM OF ALL RESPONSES TO PHQ QUESTIONS 1-9: 0
SUM OF ALL RESPONSES TO PHQ QUESTIONS 1-9: 0
2. FEELING DOWN, DEPRESSED OR HOPELESS: NOT AT ALL
2. FEELING DOWN, DEPRESSED OR HOPELESS: NOT AT ALL
SUM OF ALL RESPONSES TO PHQ QUESTIONS 1-9: 0
SUM OF ALL RESPONSES TO PHQ QUESTIONS 1-9: 0
1. LITTLE INTEREST OR PLEASURE IN DOING THINGS: NOT AT ALL

## 2025-05-16 ENCOUNTER — OFFICE VISIT (OUTPATIENT)
Dept: PRIMARY CARE CLINIC | Age: 55
End: 2025-05-16
Payer: COMMERCIAL

## 2025-05-16 VITALS
OXYGEN SATURATION: 96 % | HEIGHT: 73 IN | BODY MASS INDEX: 26.11 KG/M2 | TEMPERATURE: 96.8 F | HEART RATE: 65 BPM | RESPIRATION RATE: 16 BRPM | DIASTOLIC BLOOD PRESSURE: 78 MMHG | SYSTOLIC BLOOD PRESSURE: 104 MMHG | WEIGHT: 197 LBS

## 2025-05-16 DIAGNOSIS — J30.9 ALLERGIC RHINITIS, UNSPECIFIED SEASONALITY, UNSPECIFIED TRIGGER: ICD-10-CM

## 2025-05-16 DIAGNOSIS — E11.29 TYPE 2 DIABETES MELLITUS WITH MICROALBUMINURIA, WITHOUT LONG-TERM CURRENT USE OF INSULIN (HCC): ICD-10-CM

## 2025-05-16 DIAGNOSIS — R80.9 PROTEINURIA, UNSPECIFIED TYPE: ICD-10-CM

## 2025-05-16 DIAGNOSIS — Z23 NEED FOR HEPATITIS B VACCINATION: ICD-10-CM

## 2025-05-16 DIAGNOSIS — Z00.00 ENCOUNTER FOR WELL ADULT EXAM WITHOUT ABNORMAL FINDINGS: Primary | ICD-10-CM

## 2025-05-16 DIAGNOSIS — R80.9 TYPE 2 DIABETES MELLITUS WITH MICROALBUMINURIA, WITHOUT LONG-TERM CURRENT USE OF INSULIN (HCC): ICD-10-CM

## 2025-05-16 DIAGNOSIS — E78.2 MIXED HYPERLIPIDEMIA: ICD-10-CM

## 2025-05-16 PROBLEM — I10 PRIMARY HYPERTENSION: Status: RESOLVED | Noted: 2023-02-28 | Resolved: 2025-05-16

## 2025-05-16 PROCEDURE — 99396 PREV VISIT EST AGE 40-64: CPT | Performed by: INTERNAL MEDICINE

## 2025-05-16 PROCEDURE — 90739 HEPB VACC 2/4 DOSE ADULT IM: CPT | Performed by: INTERNAL MEDICINE

## 2025-05-16 PROCEDURE — 90471 IMMUNIZATION ADMIN: CPT | Performed by: INTERNAL MEDICINE

## 2025-05-16 RX ORDER — BLOOD SUGAR DIAGNOSTIC
1 STRIP MISCELLANEOUS DAILY
Qty: 100 EACH | Refills: 3 | Status: SHIPPED | OUTPATIENT
Start: 2025-05-16

## 2025-05-16 RX ORDER — ROSUVASTATIN CALCIUM 40 MG/1
40 TABLET, COATED ORAL DAILY
Qty: 90 TABLET | Refills: 2 | Status: SHIPPED | OUTPATIENT
Start: 2025-05-16

## 2025-05-16 RX ORDER — LISINOPRIL 10 MG/1
10 TABLET ORAL DAILY
Qty: 90 TABLET | Refills: 1 | Status: SHIPPED | OUTPATIENT
Start: 2025-05-16

## 2025-05-16 SDOH — ECONOMIC STABILITY: FOOD INSECURITY: WITHIN THE PAST 12 MONTHS, YOU WORRIED THAT YOUR FOOD WOULD RUN OUT BEFORE YOU GOT MONEY TO BUY MORE.: NEVER TRUE

## 2025-05-16 SDOH — ECONOMIC STABILITY: FOOD INSECURITY: WITHIN THE PAST 12 MONTHS, THE FOOD YOU BOUGHT JUST DIDN'T LAST AND YOU DIDN'T HAVE MONEY TO GET MORE.: NEVER TRUE

## 2025-05-16 NOTE — PATIENT INSTRUCTIONS
-Low fat, low cholesterol diet  -Low sodium diet  -Limit carbohydrates to 45 grams with meals and 15 grams with snacks  -monitor blood sugars  -goal for blood sugar fasting or pre-meal  is   -goal for blood sugar 2 hours after a meal is less than 180  -goal for blood sugar at bedtime is less than 150  -Regular aerobic exercise       Well Visit, Ages 18 to 65: Care Instructions  Well visits can help you stay healthy. Your doctor has checked your overall health and may have suggested ways to take good care of yourself. Your doctor also may have recommended tests. You can help prevent illness with healthy eating, good sleep, vaccinations, regular exercise, and other steps.    Get the tests that you and your doctor decide on. Depending on your age and risks, examples might include screening for diabetes; hepatitis C; HIV; and cervical, breast, lung, and colon cancer. Screening helps find diseases before any symptoms appear.   Eat healthy foods. Choose fruits, vegetables, whole grains, lean protein, and low-fat dairy foods. Limit saturated fat and reduce salt.     Limit alcohol. Men should have no more than 2 drinks a day. Women should have no more than 1. For some people, no alcohol is the best choice.   Exercise. Get at least 30 minutes of exercise on most days of the week. Walking can be a good choice.     Reach and stay at your healthy weight. This will lower your risk for many health problems.   Take care of your mental health. Try to stay connected with friends, family, and community, and find ways to manage stress.     If you're feeling depressed or hopeless, talk to someone. A counselor can help. If you don't have a counselor, talk to your doctor.   Talk to your doctor if you think you may have a problem with alcohol or drug use. This includes prescription medicines, marijuana, and other drugs.     Avoid tobacco and nicotine: Don't smoke, vape, or chew. If you need help quitting, talk to your doctor.

## 2025-05-16 NOTE — PROGRESS NOTES
Well Adult Note  Name: Yasir Hoskins Today’s Date: 2025   MRN: [id number] Sex: Male   Age: 55 y.o. Ethnicity: Non- / Non    : 1970 Race: Black /       Yasir Hoskins is here for a well adult exam.          Assessment & Plan  1. Encounter for well adult exam:  - Comprehensive metabolic panel, CBC with differential, TSH, lipid panel, PSA, and CBC were done on 2025; results reviewed and discussed.  - Colonoscopy performed on 2022; next one due in .  - Second dose of Heplisav-B vaccine to be administered today; eligible for COVID-19 booster at the pharmacy.  - Immunizations addressed during this visit.    2. Type 2 diabetes mellitus with microalbuminuria, without long-term current use of insulin:  - Hemoglobin A1c level of 6.6% shows diabetes is controlled  - Continue dietary control: limit carbs to 45 g per meal and 15 g per snack  - Increase lisinopril to 10 mg once daily for uncontrolled microalbuminuria  - Monitor blood sugar and goal for fasting blood glucose levels: , postprandial <180, bedtime <150  - Maintain regular aerobic exercise  - Most recent diabetic eye exam conducted in March or 2025; results will be requested  - Hemoglobin A1c in 6 months prior to next visit  - Comprehensive metabolic panel in 6 months prior to next visit   -Referral to nephrology for microalbuminuria    3. Mixed hyperlipidemia:  - stable  - Continue rosuvastatin 40 mg once daily  - low fat, low cholesterol diet  - Regular aerobic exercise   - Lipid panel in 6 months prior to next visit    4. Allergic rhinitis:  - Condition is stable.  - Continue Flonase nasal spray 2 sprays each nostril daily as needed    5. Protienuria  - recheck urinalysis  - increase lisinopril to 10 mg once daily  - referral to nephrology    6. Need for hepatitis B vaccination  - Heplisav-B to be administered today           Return in about 6 months (around 2025) for diabetes,

## 2025-05-26 ENCOUNTER — RESULTS FOLLOW-UP (OUTPATIENT)
Dept: PRIMARY CARE CLINIC | Age: 55
End: 2025-05-26

## 2025-05-26 PROBLEM — Z23 NEED FOR HEPATITIS B VACCINATION: Status: ACTIVE | Noted: 2025-05-26

## 2025-05-26 PROBLEM — R80.9 PROTEINURIA: Status: ACTIVE | Noted: 2025-05-26

## 2025-05-26 PROBLEM — R80.9 TYPE 2 DIABETES MELLITUS WITH MICROALBUMINURIA, WITHOUT LONG-TERM CURRENT USE OF INSULIN (HCC): Status: ACTIVE | Noted: 2025-05-26

## 2025-05-26 PROBLEM — E11.29 TYPE 2 DIABETES MELLITUS WITH MICROALBUMINURIA, WITHOUT LONG-TERM CURRENT USE OF INSULIN (HCC): Status: ACTIVE | Noted: 2025-05-26

## 2025-05-26 PROBLEM — Z00.00 ENCOUNTER FOR WELL ADULT EXAM WITHOUT ABNORMAL FINDINGS: Status: ACTIVE | Noted: 2025-05-26

## 2025-05-26 ASSESSMENT — ENCOUNTER SYMPTOMS
CONSTIPATION: 0
BLOOD IN STOOL: 0
COUGH: 0
NAUSEA: 0
VOMITING: 0
ANAL BLEEDING: 0
DIARRHEA: 0
CHEST TIGHTNESS: 0
SINUS PRESSURE: 0
EYE REDNESS: 0
APNEA: 0
TROUBLE SWALLOWING: 0
PHOTOPHOBIA: 0
RHINORRHEA: 0
BACK PAIN: 0
SHORTNESS OF BREATH: 0
RECTAL PAIN: 0
EYE ITCHING: 0
ABDOMINAL DISTENTION: 0
ABDOMINAL PAIN: 0
EYE PAIN: 0
VOICE CHANGE: 0
CHOKING: 0
FACIAL SWELLING: 0
EYE DISCHARGE: 0
SORE THROAT: 0
WHEEZING: 0

## 2025-06-25 PROBLEM — Z00.00 ENCOUNTER FOR WELL ADULT EXAM WITHOUT ABNORMAL FINDINGS: Status: RESOLVED | Noted: 2025-05-26 | Resolved: 2025-06-25

## 2025-08-07 ENCOUNTER — TELEPHONE (OUTPATIENT)
Dept: PRIMARY CARE CLINIC | Age: 55
End: 2025-08-07

## 2025-08-07 DIAGNOSIS — E11.9 TYPE 2 DIABETES MELLITUS WITHOUT COMPLICATION, WITHOUT LONG-TERM CURRENT USE OF INSULIN (HCC): Primary | ICD-10-CM

## 2025-08-24 RX ORDER — BLOOD SUGAR DIAGNOSTIC
1 STRIP MISCELLANEOUS DAILY
Qty: 100 EACH | Refills: 3 | Status: SHIPPED | OUTPATIENT
Start: 2025-08-24

## 2025-08-24 RX ORDER — BLOOD-GLUCOSE METER
EACH MISCELLANEOUS
Qty: 1 KIT | Refills: 0 | Status: SHIPPED | OUTPATIENT
Start: 2025-08-24

## 2025-08-24 RX ORDER — LANCETS
1 EACH MISCELLANEOUS DAILY
Qty: 100 EACH | Refills: 3 | Status: SHIPPED | OUTPATIENT
Start: 2025-08-24